# Patient Record
Sex: MALE | Race: WHITE | NOT HISPANIC OR LATINO | Employment: FULL TIME | URBAN - METROPOLITAN AREA
[De-identification: names, ages, dates, MRNs, and addresses within clinical notes are randomized per-mention and may not be internally consistent; named-entity substitution may affect disease eponyms.]

---

## 2018-11-12 ENCOUNTER — OFFICE VISIT (OUTPATIENT)
Dept: SPORTS MEDICINE | Facility: CLINIC | Age: 33
End: 2018-11-12

## 2018-11-12 VITALS
DIASTOLIC BLOOD PRESSURE: 88 MMHG | BODY MASS INDEX: 44.1 KG/M2 | WEIGHT: 315 LBS | HEIGHT: 71 IN | SYSTOLIC BLOOD PRESSURE: 116 MMHG

## 2018-11-12 DIAGNOSIS — M25.571 ACUTE RIGHT ANKLE PAIN: Primary | ICD-10-CM

## 2018-11-12 DIAGNOSIS — S92.134A CLOSED NONDISPLACED FRACTURE OF POSTERIOR PROCESS OF RIGHT TALUS, INITIAL ENCOUNTER: ICD-10-CM

## 2018-11-12 PROCEDURE — 99204 OFFICE O/P NEW MOD 45 MIN: CPT | Performed by: FAMILY MEDICINE

## 2018-11-12 PROCEDURE — 73610 X-RAY EXAM OF ANKLE: CPT | Performed by: FAMILY MEDICINE

## 2018-11-12 NOTE — PROGRESS NOTES
"Torey is a 33 y.o. year old male    Chief Complaint   Patient presents with   • Right Ankle - Edema, Numbness       History of Present Illness  HPI     Right ankle pain for a few days.  He was helping a friend move when he landed awkwardly twisted his ankle.  [place] a pop.  Had immediate swelling and also has associated bruising.  Difficulty bearing weight with first few steps.    I have reviewed the patient's medical, family, and social history in detail and updated the computerized patient record.    Review of Systems   Constitutional: Negative for fever.   Musculoskeletal:        Per HPI   Skin: Negative for rash.   Neurological: Negative for weakness and numbness.   Psychiatric/Behavioral: Negative for sleep disturbance.   All other systems reviewed and are negative.      /88   Ht 180.3 cm (71\")   Wt (!) 147 kg (324 lb)   BMI 45.19 kg/m²      Physical Exam    Vital signs reviewed.   General: No acute distress.  Eyes: conjunctiva clear; pupils equally round and reactive  ENT: external ears and nose atraumatic; oropharynx clear  CV: no peripheral edema, 2+ distal pulses  Resp: normal respiratory effort, no use of accessory muscles  Skin: no rashes or wounds; normal turgor  Psych: mood and affect appropriate; recent and remote memory intact  Neuro: sensation to light touch intact    MSK Exam:  Right Ankle Exam     Tenderness   Right ankle tenderness location: posterior talus laterally.  Swelling: moderate    Range of Motion   The patient has normal right ankle ROM.    Muscle Strength   The patient has normal right ankle strength.    Tests   Anterior drawer: negative    Comments:  Ecchymoses along lateral ankle      Left Ankle Exam   Left ankle exam is normal.          Right Ankle X-Ray  Indication: Pain  Views: AP, Lateral, Mortise    Findings:  Posterior talus fracture  No bony lesion  Soft tissues normal  Normal joint spaces    No prior studies available for comparison.    Diagnoses and all orders " for this visit:    Acute right ankle pain  -     XR Ankle 3+ View Right    Closed nondisplaced fracture of posterior process of right talus, initial encounter      Partial WB with walking boot. F/up 3 wks.    EMR Dragon/Transcription disclaimer:    Much of this encounter note is an electronic transcription/translation of spoken language to printed text.  The electronic translation of spoken language may permit erroneous, or at times, nonsensical words or phrases to be inadvertently transcribed.  Although I have reviewed the note for such errors some may still exist.

## 2018-12-03 ENCOUNTER — OFFICE VISIT (OUTPATIENT)
Dept: SPORTS MEDICINE | Facility: CLINIC | Age: 33
End: 2018-12-03

## 2018-12-03 VITALS
SYSTOLIC BLOOD PRESSURE: 122 MMHG | DIASTOLIC BLOOD PRESSURE: 88 MMHG | BODY MASS INDEX: 44.1 KG/M2 | WEIGHT: 315 LBS | HEIGHT: 71 IN

## 2018-12-03 DIAGNOSIS — M25.571 ACUTE RIGHT ANKLE PAIN: Primary | ICD-10-CM

## 2018-12-03 DIAGNOSIS — M77.31 CALCANEAL SPUR OF RIGHT FOOT: ICD-10-CM

## 2018-12-03 PROCEDURE — 99214 OFFICE O/P EST MOD 30 MIN: CPT | Performed by: FAMILY MEDICINE

## 2018-12-03 PROCEDURE — 73610 X-RAY EXAM OF ANKLE: CPT | Performed by: FAMILY MEDICINE

## 2018-12-03 NOTE — PROGRESS NOTES
"Torey is a 33 y.o. year old male    Chief Complaint   Patient presents with   • Right Ankle - Follow-up       History of Present Illness  Here for follow-up on right ankle pain.  He was partial weightbearing with walking boot up until yesterday when he took it off.  Has had some stiffness and pain though not to the level of was previously.  Swelling and bruising has resolved.    I have reviewed the patient's medical, family, and social history in detail and updated the computerized patient record.    Review of Systems  Constitutional: Negative for fever.   Musculoskeletal:        Per HPI   Skin: Negative for rash.   Neurological: Negative for weakness and numbness.   Psychiatric/Behavioral: Negative for sleep disturbance.   All other systems reviewed and are negative.    /88   Ht 180.3 cm (71\")   Wt (!) 147 kg (324 lb)   BMI 45.19 kg/m²      Physical Exam    Vital signs reviewed.   General: No acute distress.  Eyes: conjunctiva clear; pupils equally round and reactive  ENT: external ears and nose atraumatic; oropharynx clear  CV: no peripheral edema, 2+ distal pulses  Resp: normal respiratory effort, no use of accessory muscles  Skin: no rashes or wounds; normal turgor  Psych: mood and affect appropriate; recent and remote memory intact  Neuro: sensation to light touch intact    MSK Exam:    Right Ankle X-Ray  Indication: pain  Views: AP, Lateral, Mortise    Findings:  No fracture  Calcaneal heel spur that is unchanged when compared to her prior exam.  This was different interpretation when compared to radiologist over read and with today's x-ray.  Soft tissues normal  Normal joint spaces    Prior studies available for comparison.    Diagnoses and all orders for this visit:    Acute right ankle pain  -     XR Ankle 3+ View Right    Calcaneal spur of right foot  -     XR Ankle 3+ View Right      He could have irritated area where spur is located or even partial tear to one of the tendons in the ankle.  " Clinically is improving and we will continue to monitor out of boot.  Follow-up with me in 4 weeks.    EMR Dragon/Transcription disclaimer:    Much of this encounter note is an electronic transcription/translation of spoken language to printed text.  The electronic translation of spoken language may permit erroneous, or at times, nonsensical words or phrases to be inadvertently transcribed.  Although I have reviewed the note for such errors some may still exist.

## 2018-12-26 ENCOUNTER — OFFICE VISIT (OUTPATIENT)
Dept: SPORTS MEDICINE | Facility: CLINIC | Age: 33
End: 2018-12-26

## 2018-12-26 VITALS
HEIGHT: 71 IN | DIASTOLIC BLOOD PRESSURE: 94 MMHG | WEIGHT: 315 LBS | BODY MASS INDEX: 44.1 KG/M2 | SYSTOLIC BLOOD PRESSURE: 134 MMHG

## 2018-12-26 DIAGNOSIS — M67.88 RIGHT PERONEAL TENDINOSIS: ICD-10-CM

## 2018-12-26 DIAGNOSIS — M25.571 ACUTE RIGHT ANKLE PAIN: Primary | ICD-10-CM

## 2018-12-26 DIAGNOSIS — M77.31 CALCANEAL SPUR OF RIGHT FOOT: ICD-10-CM

## 2018-12-26 PROCEDURE — 99214 OFFICE O/P EST MOD 30 MIN: CPT | Performed by: FAMILY MEDICINE

## 2018-12-26 NOTE — PROGRESS NOTES
"Torey is a 33 y.o. year old male    Chief Complaint   Patient presents with   • Right Ankle - Follow-up       History of Present Illness  R ankle pain resolving though still has some intermittent pain along outside shooting down from calf in to foot. One day of swelling in ankle but after walking for long time that day. No pain at rest.    I have reviewed the patient's medical, family, and social history in detail and updated the computerized patient record.    Review of Systems  Constitutional: Negative for fever.   Musculoskeletal:        Per HPI   Skin: Negative for rash.   Neurological: Negative for weakness and numbness.   Psychiatric/Behavioral: Negative for sleep disturbance.   All other systems reviewed and are negative.    /94   Ht 180.3 cm (71\")   Wt (!) 147 kg (324 lb)   BMI 45.19 kg/m²      Physical Exam    Vital signs reviewed.   General: No acute distress.  Eyes: conjunctiva clear; pupils equally round and reactive  ENT: external ears and nose atraumatic; oropharynx clear  CV: no peripheral edema, 2+ distal pulses  Resp: normal respiratory effort, no use of accessory muscles  Skin: no rashes or wounds; normal turgor  Psych: mood and affect appropriate; recent and remote memory intact  Neuro: sensation to light touch intact    MSK Exam:    R ankle demonstrates full ROM. No bony tenderness. Neg ant drawer. 5/5 strength. No pain with single, double leg hop    Diagnoses and all orders for this visit:    Acute right ankle pain    Calcaneal spur of right foot    Right peroneal tendinosis      Likely soft tissue injury that is slowly resolving. Cont to monitor. F/up 4 wks if persists.    EMR Dragon/Transcription disclaimer:    Much of this encounter note is an electronic transcription/translation of spoken language to printed text.  The electronic translation of spoken language may permit erroneous, or at times, nonsensical words or phrases to be inadvertently transcribed.  Although I have " reviewed the note for such errors some may still exist.

## 2019-11-21 ENCOUNTER — APPOINTMENT (OUTPATIENT)
Dept: CT IMAGING | Facility: HOSPITAL | Age: 34
End: 2019-11-21

## 2019-11-21 ENCOUNTER — HOSPITAL ENCOUNTER (EMERGENCY)
Facility: HOSPITAL | Age: 34
Discharge: HOME | End: 2019-11-21
Attending: EMERGENCY MEDICINE | Admitting: EMERGENCY MEDICINE

## 2019-11-21 VITALS
BODY MASS INDEX: 33.6 KG/M2 | DIASTOLIC BLOOD PRESSURE: 98 MMHG | WEIGHT: 240 LBS | SYSTOLIC BLOOD PRESSURE: 146 MMHG | OXYGEN SATURATION: 99 % | HEART RATE: 66 BPM | TEMPERATURE: 97.6 F | HEIGHT: 71 IN | RESPIRATION RATE: 18 BRPM

## 2019-11-21 DIAGNOSIS — N20.0 KIDNEY STONE ON LEFT SIDE: Primary | ICD-10-CM

## 2019-11-21 LAB
ALBUMIN SERPL-MCNC: 4.7 G/DL (ref 3.5–5.2)
ALBUMIN/GLOB SERPL: 1.6 G/DL
ALP SERPL-CCNC: 59 U/L (ref 39–117)
ALT SERPL W P-5'-P-CCNC: 29 U/L (ref 1–41)
ANION GAP SERPL CALCULATED.3IONS-SCNC: 12.7 MMOL/L (ref 5–15)
AST SERPL-CCNC: 17 U/L (ref 1–40)
BACTERIA UR QL AUTO: ABNORMAL /HPF
BASOPHILS # BLD AUTO: 0.03 10*3/MM3 (ref 0–0.2)
BASOPHILS NFR BLD AUTO: 0.3 % (ref 0–1.5)
BILIRUB SERPL-MCNC: 1.8 MG/DL (ref 0.2–1.2)
BILIRUB UR QL STRIP: NEGATIVE
BUN BLD-MCNC: 23 MG/DL (ref 6–20)
BUN/CREAT SERPL: 19.2 (ref 7–25)
CALCIUM SPEC-SCNC: 9.7 MG/DL (ref 8.6–10.5)
CHLORIDE SERPL-SCNC: 108 MMOL/L (ref 98–107)
CLARITY UR: CLEAR
CO2 SERPL-SCNC: 25.3 MMOL/L (ref 22–29)
COLOR UR: YELLOW
CREAT BLD-MCNC: 1.2 MG/DL (ref 0.76–1.27)
DEPRECATED RDW RBC AUTO: 45.7 FL (ref 37–54)
EOSINOPHIL # BLD AUTO: 0.04 10*3/MM3 (ref 0–0.4)
EOSINOPHIL NFR BLD AUTO: 0.5 % (ref 0.3–6.2)
ERYTHROCYTE [DISTWIDTH] IN BLOOD BY AUTOMATED COUNT: 13.3 % (ref 12.3–15.4)
GFR SERPL CREATININE-BSD FRML MDRD: 69 ML/MIN/1.73
GLOBULIN UR ELPH-MCNC: 2.9 GM/DL
GLUCOSE BLD-MCNC: 91 MG/DL (ref 65–99)
GLUCOSE UR STRIP-MCNC: NEGATIVE MG/DL
HCT VFR BLD AUTO: 45.9 % (ref 37.5–51)
HGB BLD-MCNC: 15.2 G/DL (ref 13–17.7)
HGB UR QL STRIP.AUTO: ABNORMAL
HOLD SPECIMEN: NORMAL
HOLD SPECIMEN: NORMAL
HYALINE CASTS UR QL AUTO: ABNORMAL /LPF
IMM GRANULOCYTES # BLD AUTO: 0.04 10*3/MM3 (ref 0–0.05)
IMM GRANULOCYTES NFR BLD AUTO: 0.5 % (ref 0–0.5)
KETONES UR QL STRIP: NEGATIVE
LEUKOCYTE ESTERASE UR QL STRIP.AUTO: ABNORMAL
LIPASE SERPL-CCNC: 22 U/L (ref 13–60)
LYMPHOCYTES # BLD AUTO: 1.23 10*3/MM3 (ref 0.7–3.1)
LYMPHOCYTES NFR BLD AUTO: 14.3 % (ref 19.6–45.3)
MCH RBC QN AUTO: 30.4 PG (ref 26.6–33)
MCHC RBC AUTO-ENTMCNC: 33.1 G/DL (ref 31.5–35.7)
MCV RBC AUTO: 91.8 FL (ref 79–97)
MONOCYTES # BLD AUTO: 0.86 10*3/MM3 (ref 0.1–0.9)
MONOCYTES NFR BLD AUTO: 10 % (ref 5–12)
NEUTROPHILS # BLD AUTO: 6.42 10*3/MM3 (ref 1.7–7)
NEUTROPHILS NFR BLD AUTO: 74.4 % (ref 42.7–76)
NITRITE UR QL STRIP: NEGATIVE
NRBC BLD AUTO-RTO: 0 /100 WBC (ref 0–0.2)
PH UR STRIP.AUTO: 5.5 [PH] (ref 5–8)
PLATELET # BLD AUTO: 168 10*3/MM3 (ref 140–450)
PMV BLD AUTO: 11.6 FL (ref 6–12)
POTASSIUM BLD-SCNC: 4.4 MMOL/L (ref 3.5–5.2)
PROT SERPL-MCNC: 7.6 G/DL (ref 6–8.5)
PROT UR QL STRIP: NEGATIVE
RBC # BLD AUTO: 5 10*6/MM3 (ref 4.14–5.8)
RBC # UR: ABNORMAL /HPF
REF LAB TEST METHOD: ABNORMAL
SODIUM BLD-SCNC: 146 MMOL/L (ref 136–145)
SP GR UR STRIP: 1.02 (ref 1–1.03)
SQUAMOUS #/AREA URNS HPF: ABNORMAL /HPF
UROBILINOGEN UR QL STRIP: ABNORMAL
WBC NRBC COR # BLD: 8.62 10*3/MM3 (ref 3.4–10.8)
WBC UR QL AUTO: ABNORMAL /HPF
WHOLE BLOOD HOLD SPECIMEN: NORMAL
WHOLE BLOOD HOLD SPECIMEN: NORMAL

## 2019-11-21 PROCEDURE — 81001 URINALYSIS AUTO W/SCOPE: CPT | Performed by: EMERGENCY MEDICINE

## 2019-11-21 PROCEDURE — 83690 ASSAY OF LIPASE: CPT | Performed by: EMERGENCY MEDICINE

## 2019-11-21 PROCEDURE — 74176 CT ABD & PELVIS W/O CONTRAST: CPT

## 2019-11-21 PROCEDURE — 36415 COLL VENOUS BLD VENIPUNCTURE: CPT

## 2019-11-21 PROCEDURE — 85025 COMPLETE CBC W/AUTO DIFF WBC: CPT | Performed by: EMERGENCY MEDICINE

## 2019-11-21 PROCEDURE — 80053 COMPREHEN METABOLIC PANEL: CPT | Performed by: EMERGENCY MEDICINE

## 2019-11-21 PROCEDURE — 99284 EMERGENCY DEPT VISIT MOD MDM: CPT

## 2019-11-21 RX ORDER — SODIUM CHLORIDE 0.9 % (FLUSH) 0.9 %
10 SYRINGE (ML) INJECTION AS NEEDED
Status: DISCONTINUED | OUTPATIENT
Start: 2019-11-21 | End: 2019-11-21 | Stop reason: HOSPADM

## 2019-11-21 RX ORDER — ONDANSETRON 4 MG/1
4 TABLET, FILM COATED ORAL 4 TIMES DAILY PRN
Qty: 10 TABLET | Refills: 0 | Status: SHIPPED | OUTPATIENT
Start: 2019-11-21 | End: 2020-07-09

## 2019-11-21 RX ORDER — OXYCODONE HYDROCHLORIDE AND ACETAMINOPHEN 5; 325 MG/1; MG/1
1 TABLET ORAL EVERY 6 HOURS PRN
Qty: 20 TABLET | Refills: 0 | Status: SHIPPED | OUTPATIENT
Start: 2019-11-21 | End: 2020-07-09

## 2019-11-21 NOTE — ED TRIAGE NOTES
Patient presents to er via private vehicle from home.  Patient is reporting left flank pain that radiates to right groin since last night

## 2019-11-21 NOTE — PROGRESS NOTES
Per ER list, patient without current PCP.  Entered room, identified self, explained role, and verified facesheet information.  Patient confirmed that he did not currently have a PCP; [facility] provider list provided to patient with encouragement to contact as able to ensure continuity of care post-d/c.  Patient verbalized understanding and denied further questions or concerns.  Nelson Rivera RN

## 2019-11-21 NOTE — ED PROVIDER NOTES
EMERGENCY DEPARTMENT ENCOUNTER    Room Number:  08/08  PCP: Provider, No Known  Historian: Pt  History Limited By: None      HPI  Chief Complaint: Left flank pain  Context: Torey Coy is a 34 y.o. male who presents to the ED c/o constant left flank pain that radiates to his groin which has been worsening since 2:00AM. Pt denies fever, CP, SOA, abd pain, N/V/D, urinary sx, and testicular swelling and pain. Pt took Advil for the pain with temporary relief. Pt has a h/o kidney stones.       Location: L flank   Radiation: radiates to groin   Character: sharp  Duration: several hours   Severity: moderate   Progression: worsening   Aggravating Factors: none stated   Alleviating Factors: none stated        PAST MEDICAL HISTORY  Active Ambulatory Problems     Diagnosis Date Noted   • No Active Ambulatory Problems     Resolved Ambulatory Problems     Diagnosis Date Noted   • No Resolved Ambulatory Problems     No Additional Past Medical History         PAST SURGICAL HISTORY  Past Surgical History:   Procedure Laterality Date   • EYE SURGERY     • TUMOR REMOVAL      LT ear         FAMILY HISTORY  Family History   Problem Relation Age of Onset   • No Known Problems Mother    • No Known Problems Father          SOCIAL HISTORY  Social History     Socioeconomic History   • Marital status: Single     Spouse name: Not on file   • Number of children: Not on file   • Years of education: Not on file   • Highest education level: Not on file   Tobacco Use   • Smoking status: Never Smoker   Substance and Sexual Activity   • Alcohol use: Yes     Comment: occasional   • Sexual activity: Defer         ALLERGIES  Patient has no known allergies.        REVIEW OF SYSTEMS  Review of Systems   Constitutional: Negative for activity change, appetite change and fever.   HENT: Negative for congestion and sore throat.    Eyes: Negative.    Respiratory: Negative for cough and shortness of breath.    Cardiovascular: Negative for chest pain  and leg swelling.   Gastrointestinal: Negative for abdominal pain, diarrhea, nausea and vomiting.   Endocrine: Negative.    Genitourinary: Positive for flank pain (left flank). Negative for decreased urine volume, dysuria, hematuria, scrotal swelling and testicular pain.   Musculoskeletal: Negative for neck pain.   Skin: Negative for rash and wound.   Allergic/Immunologic: Negative.    Neurological: Negative for weakness, numbness and headaches.   Hematological: Negative.    Psychiatric/Behavioral: Negative.    All other systems reviewed and are negative.           PHYSICAL EXAM  ED Triage Vitals [11/21/19 1243]   Temp Heart Rate Resp BP SpO2   96.2 °F (35.7 °C) 74 15 -- 97 %      Temp src Heart Rate Source Patient Position BP Location FiO2 (%)   Tympanic Monitor -- -- --       Physical Exam   Constitutional: He is oriented to person, place, and time. No distress.   HENT:   Head: Normocephalic and atraumatic.   Eyes: EOM are normal. Pupils are equal, round, and reactive to light.   Neck: Normal range of motion. Neck supple.   Cardiovascular: Normal rate, regular rhythm and normal heart sounds.   Pulmonary/Chest: Effort normal and breath sounds normal. No respiratory distress.   Abdominal: Soft. There is no tenderness. There is CVA tenderness (mild left). There is no rebound and no guarding.   Musculoskeletal: Normal range of motion. He exhibits no edema.   Neurological: He is alert and oriented to person, place, and time. He has normal sensation and normal strength.   Skin: Skin is warm and dry.   Psychiatric: Mood and affect normal.   Nursing note and vitals reviewed.          LAB RESULTS  Recent Results (from the past 24 hour(s))   Comprehensive Metabolic Panel    Collection Time: 11/21/19 12:56 PM   Result Value Ref Range    Glucose 91 65 - 99 mg/dL    BUN 23 (H) 6 - 20 mg/dL    Creatinine 1.20 0.76 - 1.27 mg/dL    Sodium 146 (H) 136 - 145 mmol/L    Potassium 4.4 3.5 - 5.2 mmol/L    Chloride 108 (H) 98 - 107  mmol/L    CO2 25.3 22.0 - 29.0 mmol/L    Calcium 9.7 8.6 - 10.5 mg/dL    Total Protein 7.6 6.0 - 8.5 g/dL    Albumin 4.70 3.50 - 5.20 g/dL    ALT (SGPT) 29 1 - 41 U/L    AST (SGOT) 17 1 - 40 U/L    Alkaline Phosphatase 59 39 - 117 U/L    Total Bilirubin 1.8 (H) 0.2 - 1.2 mg/dL    eGFR Non African Amer 69 >60 mL/min/1.73    Globulin 2.9 gm/dL    A/G Ratio 1.6 g/dL    BUN/Creatinine Ratio 19.2 7.0 - 25.0    Anion Gap 12.7 5.0 - 15.0 mmol/L   Lipase    Collection Time: 11/21/19 12:56 PM   Result Value Ref Range    Lipase 22 13 - 60 U/L   Light Blue Top    Collection Time: 11/21/19 12:56 PM   Result Value Ref Range    Extra Tube hold for add-on    Green Top (Gel)    Collection Time: 11/21/19 12:56 PM   Result Value Ref Range    Extra Tube Hold for add-ons.    Lavender Top    Collection Time: 11/21/19 12:56 PM   Result Value Ref Range    Extra Tube hold for add-on    Gold Top - SST    Collection Time: 11/21/19 12:56 PM   Result Value Ref Range    Extra Tube Hold for add-ons.    CBC Auto Differential    Collection Time: 11/21/19 12:56 PM   Result Value Ref Range    WBC 8.62 3.40 - 10.80 10*3/mm3    RBC 5.00 4.14 - 5.80 10*6/mm3    Hemoglobin 15.2 13.0 - 17.7 g/dL    Hematocrit 45.9 37.5 - 51.0 %    MCV 91.8 79.0 - 97.0 fL    MCH 30.4 26.6 - 33.0 pg    MCHC 33.1 31.5 - 35.7 g/dL    RDW 13.3 12.3 - 15.4 %    RDW-SD 45.7 37.0 - 54.0 fl    MPV 11.6 6.0 - 12.0 fL    Platelets 168 140 - 450 10*3/mm3    Neutrophil % 74.4 42.7 - 76.0 %    Lymphocyte % 14.3 (L) 19.6 - 45.3 %    Monocyte % 10.0 5.0 - 12.0 %    Eosinophil % 0.5 0.3 - 6.2 %    Basophil % 0.3 0.0 - 1.5 %    Immature Grans % 0.5 0.0 - 0.5 %    Neutrophils, Absolute 6.42 1.70 - 7.00 10*3/mm3    Lymphocytes, Absolute 1.23 0.70 - 3.10 10*3/mm3    Monocytes, Absolute 0.86 0.10 - 0.90 10*3/mm3    Eosinophils, Absolute 0.04 0.00 - 0.40 10*3/mm3    Basophils, Absolute 0.03 0.00 - 0.20 10*3/mm3    Immature Grans, Absolute 0.04 0.00 - 0.05 10*3/mm3    nRBC 0.0 0.0 - 0.2 /100  WBC   Urinalysis With Microscopic If Indicated (No Culture) - Urine, Clean Catch    Collection Time: 11/21/19  1:13 PM   Result Value Ref Range    Color, UA Yellow Yellow, Straw    Appearance, UA Clear Clear    pH, UA 5.5 5.0 - 8.0    Specific Gravity, UA 1.019 1.005 - 1.030    Glucose, UA Negative Negative    Ketones, UA Negative Negative    Bilirubin, UA Negative Negative    Blood, UA Trace (A) Negative    Protein, UA Negative Negative    Leuk Esterase, UA Trace (A) Negative    Nitrite, UA Negative Negative    Urobilinogen, UA 0.2 E.U./dL 0.2 - 1.0 E.U./dL   Urinalysis, Microscopic Only - Urine, Clean Catch    Collection Time: 11/21/19  1:13 PM   Result Value Ref Range    RBC, UA 13-20 (A) None Seen, 0-2 /HPF    WBC, UA 6-12 (A) None Seen, 0-2 /HPF    Bacteria, UA None Seen None Seen /HPF    Squamous Epithelial Cells, UA 0-2 None Seen, 0-2 /HPF    Hyaline Casts, UA None Seen None Seen /LPF    Methodology Automated Microscopy        Ordered the above labs and reviewed the results.        RADIOLOGY  CT Abdomen Pelvis Without Contrast   Preliminary Result   There is a 9 mm stone within the proximal left ureter just   distal to the UPJ with moderate hydroureteronephrosis.       Discussed with Dr. Oseguera.               Ordered the above noted radiological studies. Reviewed by me in PACS.         PROCEDURES  Procedures          MEDICATIONS GIVEN IN ER  Medications   sodium chloride 0.9 % flush 10 mL (not administered)             PROGRESS AND CONSULTS  ED Course as of Nov 21 1613   Thu Nov 21, 2019   1544 3:44 PM  Patient here for flank pain and appears to have a kidney stone.  Large stone.  His pain has not been an issue.  No signs of infection.  Discussed with Dr. Santamaria who will see him in the office tomorrow.  NPO after midnight.  Be in the office at 8 am.  [SL]      ED Course User Index  [SL] Sanket Oseguera MD     12:47 PM  Labs ordered.     1:09 PM  CT abd pelvis ordered. Pt declined pain medicine at this  time.     2:59 PM  Dr. Spann, radiology, reports CT abd pelvis shows a 9mm left ureteral stone.   Rechecked pt who is resting in NAD. Informed him of the stone seen on CT. Consult placed to urology.     4161 Discussed with Dr. Santamaria who will see patient in the office tomorrow at 8am.  NPO.      MEDICAL DECISION MAKING      MDM  Number of Diagnoses or Management Options     Amount and/or Complexity of Data Reviewed  Clinical lab tests: ordered and reviewed (Urinalysis- RBC 13-20, WBC 6-12, Bacteria none)  Tests in the radiology section of CPT®: ordered and reviewed ( CT abd pelvis shows a 9mm left ureteral stone)  Discussion of test results with the performing providers: yes (Dr. Spann)  Decide to obtain previous medical records or to obtain history from someone other than the patient: yes  Review and summarize past medical records: yes               DIAGNOSIS  Final diagnoses:   Kidney stone on left side           DISPOSITION  DISCHARGE    Patient discharged in stable condition.    Reviewed implications of results, diagnosis, meds, responsibility to follow up, warning signs and symptoms of possible worsening, potential complications and reasons to return to ER.    Patient/Family voiced understanding of above instructions.    Discussed plan for discharge, as there is no emergent indication for admission. Patient referred to primary care provider for BP management due to today's BP. Pt/family is agreeable and understands need for follow up and repeat testing.  Pt is aware that discharge does not mean that nothing is wrong but it indicates no emergency is present that requires admission and they must continue care with follow-up as given below or physician of their choice.     FOLLOW-UP  Torey Santamaria MD  [address] [id number] [phone]    Go to   tomorrow at 8am         Medication List      New Prescriptions    ondansetron 4 MG tablet  Commonly known as:  ZOFRAN  Take 1 tablet by mouth  4 (Four) Times a Day As Needed for Nausea or   Vomiting.     oxyCODONE-acetaminophen 5-325 MG per tablet  Commonly known as:  PERCOCET  Take 1 tablet by mouth Every 6 (Six) Hours As Needed for Severe Pain .              Latest Documented Vital Signs:  As of 4:13 PM  BP- 146/98 HR- 66 Temp- 97.6 °F (36.4 °C) (Oral) O2 sat- 99%        --  Documentation assistance provided by paola Starr for Dr. Oumar MD.  Information recorded by the scribe was done at my direction and has been verified and validated by me.           Rosie Starr  11/21/19 1514       Sanket Oseguera MD  11/21/19 1617

## 2019-11-22 ENCOUNTER — HOSPITAL ENCOUNTER (OUTPATIENT)
Facility: HOSPITAL | Age: 34
Setting detail: HOSPITAL OUTPATIENT SURGERY
Discharge: HOME | End: 2019-11-22
Attending: UROLOGY | Admitting: UROLOGY

## 2019-11-22 ENCOUNTER — ANESTHESIA EVENT (OUTPATIENT)
Dept: PERIOP | Facility: HOSPITAL | Age: 34
End: 2019-11-22

## 2019-11-22 ENCOUNTER — ANESTHESIA (OUTPATIENT)
Dept: PERIOP | Facility: HOSPITAL | Age: 34
End: 2019-11-22

## 2019-11-22 VITALS
TEMPERATURE: 98.5 F | RESPIRATION RATE: 16 BRPM | OXYGEN SATURATION: 97 % | SYSTOLIC BLOOD PRESSURE: 122 MMHG | WEIGHT: 245.81 LBS | BODY MASS INDEX: 34.28 KG/M2 | HEART RATE: 61 BPM | DIASTOLIC BLOOD PRESSURE: 81 MMHG

## 2019-11-22 DIAGNOSIS — N20.1 URETERAL STONE: Primary | ICD-10-CM

## 2019-11-22 PROCEDURE — C2617 STENT, NON-COR, TEM W/O DEL: HCPCS | Performed by: UROLOGY

## 2019-11-22 PROCEDURE — 25010000002 ONDANSETRON PER 1 MG: Performed by: NURSE ANESTHETIST, CERTIFIED REGISTERED

## 2019-11-22 PROCEDURE — C1758 CATHETER, URETERAL: HCPCS | Performed by: UROLOGY

## 2019-11-22 PROCEDURE — 25010000002 CEFAZOLIN PER 500 MG: Performed by: NURSE ANESTHETIST, CERTIFIED REGISTERED

## 2019-11-22 PROCEDURE — C1769 GUIDE WIRE: HCPCS | Performed by: UROLOGY

## 2019-11-22 PROCEDURE — 25010000002 FENTANYL CITRATE (PF) 100 MCG/2ML SOLUTION: Performed by: ANESTHESIOLOGY

## 2019-11-22 PROCEDURE — 25010000002 PROPOFOL 10 MG/ML EMULSION: Performed by: NURSE ANESTHETIST, CERTIFIED REGISTERED

## 2019-11-22 PROCEDURE — 25010000002 DEXAMETHASONE PER 1 MG: Performed by: NURSE ANESTHETIST, CERTIFIED REGISTERED

## 2019-11-22 PROCEDURE — 25010000002 FENTANYL CITRATE (PF) 100 MCG/2ML SOLUTION: Performed by: NURSE ANESTHETIST, CERTIFIED REGISTERED

## 2019-11-22 DEVICE — URETERAL STENT
Type: IMPLANTABLE DEVICE | Site: URETER | Status: FUNCTIONAL
Brand: CONTOUR™

## 2019-11-22 RX ORDER — PROPOFOL 10 MG/ML
VIAL (ML) INTRAVENOUS AS NEEDED
Status: DISCONTINUED | OUTPATIENT
Start: 2019-11-22 | End: 2019-11-22 | Stop reason: SURG

## 2019-11-22 RX ORDER — LIDOCAINE HYDROCHLORIDE 20 MG/ML
INJECTION, SOLUTION INFILTRATION; PERINEURAL AS NEEDED
Status: DISCONTINUED | OUTPATIENT
Start: 2019-11-22 | End: 2019-11-22 | Stop reason: SURG

## 2019-11-22 RX ORDER — PROMETHAZINE HYDROCHLORIDE 25 MG/ML
6.25 INJECTION, SOLUTION INTRAMUSCULAR; INTRAVENOUS ONCE AS NEEDED
Status: DISCONTINUED | OUTPATIENT
Start: 2019-11-22 | End: 2019-11-22 | Stop reason: HOSPADM

## 2019-11-22 RX ORDER — HYDRALAZINE HYDROCHLORIDE 20 MG/ML
5 INJECTION INTRAMUSCULAR; INTRAVENOUS
Status: DISCONTINUED | OUTPATIENT
Start: 2019-11-22 | End: 2019-11-22 | Stop reason: HOSPADM

## 2019-11-22 RX ORDER — GLYCOPYRROLATE 0.2 MG/ML
INJECTION INTRAMUSCULAR; INTRAVENOUS AS NEEDED
Status: DISCONTINUED | OUTPATIENT
Start: 2019-11-22 | End: 2019-11-22 | Stop reason: SURG

## 2019-11-22 RX ORDER — SODIUM CHLORIDE 0.9 % (FLUSH) 0.9 %
3-10 SYRINGE (ML) INJECTION AS NEEDED
Status: DISCONTINUED | OUTPATIENT
Start: 2019-11-22 | End: 2019-11-22 | Stop reason: HOSPADM

## 2019-11-22 RX ORDER — CEFAZOLIN SODIUM 500 MG/2.2ML
INJECTION, POWDER, FOR SOLUTION INTRAMUSCULAR; INTRAVENOUS AS NEEDED
Status: DISCONTINUED | OUTPATIENT
Start: 2019-11-22 | End: 2019-11-22 | Stop reason: SURG

## 2019-11-22 RX ORDER — PROMETHAZINE HYDROCHLORIDE 25 MG/1
25 SUPPOSITORY RECTAL ONCE AS NEEDED
Status: DISCONTINUED | OUTPATIENT
Start: 2019-11-22 | End: 2019-11-22 | Stop reason: HOSPADM

## 2019-11-22 RX ORDER — NALBUPHINE HCL 10 MG/ML
10 AMPUL (ML) INJECTION EVERY 4 HOURS PRN
Status: DISCONTINUED | OUTPATIENT
Start: 2019-11-22 | End: 2019-11-22 | Stop reason: HOSPADM

## 2019-11-22 RX ORDER — ACETAMINOPHEN 650 MG/1
650 SUPPOSITORY RECTAL ONCE AS NEEDED
Status: DISCONTINUED | OUTPATIENT
Start: 2019-11-22 | End: 2019-11-22 | Stop reason: HOSPADM

## 2019-11-22 RX ORDER — LIDOCAINE HYDROCHLORIDE 10 MG/ML
0.5 INJECTION, SOLUTION EPIDURAL; INFILTRATION; INTRACAUDAL; PERINEURAL ONCE AS NEEDED
Status: DISCONTINUED | OUTPATIENT
Start: 2019-11-22 | End: 2019-11-22 | Stop reason: HOSPADM

## 2019-11-22 RX ORDER — ACETAMINOPHEN 325 MG/1
650 TABLET ORAL ONCE AS NEEDED
Status: DISCONTINUED | OUTPATIENT
Start: 2019-11-22 | End: 2019-11-22 | Stop reason: HOSPADM

## 2019-11-22 RX ORDER — FENTANYL CITRATE 50 UG/ML
50 INJECTION, SOLUTION INTRAMUSCULAR; INTRAVENOUS
Status: DISCONTINUED | OUTPATIENT
Start: 2019-11-22 | End: 2019-11-22 | Stop reason: HOSPADM

## 2019-11-22 RX ORDER — MIDAZOLAM HYDROCHLORIDE 1 MG/ML
2 INJECTION INTRAMUSCULAR; INTRAVENOUS
Status: DISCONTINUED | OUTPATIENT
Start: 2019-11-22 | End: 2019-11-22 | Stop reason: HOSPADM

## 2019-11-22 RX ORDER — ONDANSETRON 2 MG/ML
INJECTION INTRAMUSCULAR; INTRAVENOUS AS NEEDED
Status: DISCONTINUED | OUTPATIENT
Start: 2019-11-22 | End: 2019-11-22 | Stop reason: SURG

## 2019-11-22 RX ORDER — SULFAMETHOXAZOLE AND TRIMETHOPRIM 800; 160 MG/1; MG/1
1 TABLET ORAL 2 TIMES DAILY
Qty: 10 TABLET | Refills: 0 | Status: SHIPPED | OUTPATIENT
Start: 2019-11-22 | End: 2020-07-09

## 2019-11-22 RX ORDER — SODIUM CHLORIDE, SODIUM LACTATE, POTASSIUM CHLORIDE, CALCIUM CHLORIDE 600; 310; 30; 20 MG/100ML; MG/100ML; MG/100ML; MG/100ML
9 INJECTION, SOLUTION INTRAVENOUS CONTINUOUS
Status: DISCONTINUED | OUTPATIENT
Start: 2019-11-22 | End: 2019-11-22 | Stop reason: HOSPADM

## 2019-11-22 RX ORDER — NALBUPHINE HCL 10 MG/ML
2 AMPUL (ML) INJECTION EVERY 4 HOURS PRN
Status: DISCONTINUED | OUTPATIENT
Start: 2019-11-22 | End: 2019-11-22 | Stop reason: HOSPADM

## 2019-11-22 RX ORDER — NALOXONE HCL 0.4 MG/ML
0.4 VIAL (ML) INJECTION AS NEEDED
Status: DISCONTINUED | OUTPATIENT
Start: 2019-11-22 | End: 2019-11-22 | Stop reason: HOSPADM

## 2019-11-22 RX ORDER — PROMETHAZINE HYDROCHLORIDE 25 MG/1
25 TABLET ORAL ONCE AS NEEDED
Status: DISCONTINUED | OUTPATIENT
Start: 2019-11-22 | End: 2019-11-22 | Stop reason: HOSPADM

## 2019-11-22 RX ORDER — DEXAMETHASONE SODIUM PHOSPHATE 10 MG/ML
INJECTION INTRAMUSCULAR; INTRAVENOUS AS NEEDED
Status: DISCONTINUED | OUTPATIENT
Start: 2019-11-22 | End: 2019-11-22 | Stop reason: SURG

## 2019-11-22 RX ORDER — SODIUM CHLORIDE 0.9 % (FLUSH) 0.9 %
3 SYRINGE (ML) INJECTION EVERY 12 HOURS SCHEDULED
Status: DISCONTINUED | OUTPATIENT
Start: 2019-11-22 | End: 2019-11-22 | Stop reason: HOSPADM

## 2019-11-22 RX ORDER — HYDROCODONE BITARTRATE AND ACETAMINOPHEN 5; 325 MG/1; MG/1
1 TABLET ORAL ONCE AS NEEDED
Status: COMPLETED | OUTPATIENT
Start: 2019-11-22 | End: 2019-11-22

## 2019-11-22 RX ORDER — MIDAZOLAM HYDROCHLORIDE 1 MG/ML
1 INJECTION INTRAMUSCULAR; INTRAVENOUS
Status: DISCONTINUED | OUTPATIENT
Start: 2019-11-22 | End: 2019-11-22 | Stop reason: HOSPADM

## 2019-11-22 RX ORDER — ACETAMINOPHEN 500 MG
500 TABLET ORAL ONCE
Status: COMPLETED | OUTPATIENT
Start: 2019-11-22 | End: 2019-11-22

## 2019-11-22 RX ORDER — FENTANYL CITRATE 50 UG/ML
INJECTION, SOLUTION INTRAMUSCULAR; INTRAVENOUS AS NEEDED
Status: DISCONTINUED | OUTPATIENT
Start: 2019-11-22 | End: 2019-11-22 | Stop reason: SURG

## 2019-11-22 RX ORDER — DIPHENHYDRAMINE HYDROCHLORIDE 50 MG/ML
12.5 INJECTION INTRAMUSCULAR; INTRAVENOUS
Status: DISCONTINUED | OUTPATIENT
Start: 2019-11-22 | End: 2019-11-22 | Stop reason: HOSPADM

## 2019-11-22 RX ADMIN — FENTANYL CITRATE 50 MCG: 50 INJECTION, SOLUTION INTRAMUSCULAR; INTRAVENOUS at 16:11

## 2019-11-22 RX ADMIN — CEFAZOLIN 2 G: 225 INJECTION, POWDER, FOR SOLUTION INTRAMUSCULAR; INTRAVENOUS at 15:11

## 2019-11-22 RX ADMIN — ONDANSETRON HYDROCHLORIDE 4 MG: 2 SOLUTION INTRAMUSCULAR; INTRAVENOUS at 15:25

## 2019-11-22 RX ADMIN — PROPOFOL 200 MG: 10 INJECTION, EMULSION INTRAVENOUS at 15:06

## 2019-11-22 RX ADMIN — HYDROCODONE BITARTRATE AND ACETAMINOPHEN 1 TABLET: 5; 325 TABLET ORAL at 16:22

## 2019-11-22 RX ADMIN — ACETAMINOPHEN 500 MG: 500 TABLET, FILM COATED ORAL at 14:18

## 2019-11-22 RX ADMIN — GLYCOPYRROLATE 0.2 MG: 0.2 INJECTION INTRAMUSCULAR; INTRAVENOUS at 15:14

## 2019-11-22 RX ADMIN — LIDOCAINE HYDROCHLORIDE 60 MG: 20 INJECTION, SOLUTION INFILTRATION; PERINEURAL at 15:06

## 2019-11-22 RX ADMIN — FENTANYL CITRATE 50 MCG: 50 INJECTION INTRAMUSCULAR; INTRAVENOUS at 15:06

## 2019-11-22 RX ADMIN — SODIUM CHLORIDE, POTASSIUM CHLORIDE, SODIUM LACTATE AND CALCIUM CHLORIDE: 600; 310; 30; 20 INJECTION, SOLUTION INTRAVENOUS at 15:55

## 2019-11-22 RX ADMIN — SODIUM CHLORIDE, POTASSIUM CHLORIDE, SODIUM LACTATE AND CALCIUM CHLORIDE 9 ML/HR: 600; 310; 30; 20 INJECTION, SOLUTION INTRAVENOUS at 14:18

## 2019-11-22 RX ADMIN — DEXAMETHASONE SODIUM PHOSPHATE 8 MG: 10 INJECTION INTRAMUSCULAR; INTRAVENOUS at 15:13

## 2019-11-22 NOTE — ANESTHESIA PROCEDURE NOTES
Airway  Urgency: elective    Date/Time: 11/22/2019 3:08 PM  Airway not difficult    General Information and Staff    Patient location during procedure: OR  Anesthesiologist: Render, Jeffrey Ray, MD  CRNA: Olivia Razo CRNA    Indications and Patient Condition  Indications for airway management: airway protection    Preoxygenated: yes  MILS maintained throughout  Mask difficulty assessment: 1 - vent by mask    Final Airway Details  Final airway type: supraglottic airway      Successful airway: classic  Size 5    Number of attempts at approach: 1  Assessment: lips, teeth, and gum same as pre-op    Additional Comments  Pt preoxygenated, sivi, LMA placed with adequate seal and TV

## 2019-11-22 NOTE — OP NOTE
PREOPERATIVE DIAGNOSIS: Left ureteral stone.     POSTOPERATIVE DIAGNOSIS: Same    PROCEDURE: Cystoscopy left ureteral stent placement left extracorporal shockwave lithotripsy.    SURGEON:  Joseph Ramos MD    ASSISTANT: None    ANESTHESIA: General    EBL: None    DRAINS: 6 Canadian by 28 cm double-J ureteral stent.    COMPLICATIONS: None    FINDINGS: Left UPJ stone.    INDICATIONS FOR PROCEDURE: Prevent 9 mm left UPJ stone.  Patient presents today for cystoscopy left stent placement left ESWL.  Risk and benefits were explained include but not limited to bleeding, infection, damage to kidney ureter.  Patient understands this is a staged procedure.  Patient consented to the procedure.    DESCRIPTION OF PROCEDURE: After receiving antibiotics was taken to the cystoscopy suite placed in supine position on the lithotripsy table.  He underwent a general anesthesia.  After adequate anesthesia was obtained his groins prepped and draped sterile fashion.  A flexible cystoscope was introduced into the urethra and the bladder the urethra and prostate within normal limits once into the bladder the ureteral orifice ease and noted bilaterally.  There were no masses or stones in the bladder.  I placed an 035 Bentson tip guidewire into the left orifice and passed a stone into the left renal pelvis which was confirmed by fluoroscopy.  I then placed a 6 Canadian by 28 cm double-J ureteral stent over the guidewire Seldinger technique.  There is good coil noted in the left renal pelvis which was confirmed by fluoroscopy.  There is good coil noted in the bladder confirmed by cystoscopy.  The cystoscope was removed the string was taped to the penis.    The stone in the proximal ureter was then placed between the crosshairs both AP and laterally.  The stone received a total of 3000 shocks at a max power 26 kV and appeared to fragment nicely.  He was extubated taken recovery in satisfactory condition.  He tolerated procedure well.

## 2019-11-22 NOTE — ANESTHESIA PREPROCEDURE EVALUATION
Anesthesia Evaluation     NPO Solid Status: > 8 hours             Airway   Mallampati: II  TM distance: >3 FB  Neck ROM: full  No difficulty expected  Dental - normal exam     Pulmonary - negative pulmonary ROS and normal exam   Cardiovascular - negative cardio ROS    Rhythm: regular        Neuro/Psych- negative ROS  GI/Hepatic/Renal/Endo    (+)   renal disease,     Musculoskeletal (-) negative ROS    Abdominal    Substance History - negative use     OB/GYN          Other                        Anesthesia Plan    ASA 2     general   (  D/W R&B of GA including but not limited to: heart, lung, liver, kidney, neurologic problems, positioning injuries, dental damage, corneal abrasion and TMJ.  .)  intravenous induction     Anesthetic plan, all risks, benefits, and alternatives have been provided, discussed and informed consent has been obtained with: patient.

## 2019-11-22 NOTE — ANESTHESIA POSTPROCEDURE EVALUATION
Patient: Torey Coy    Procedure Summary     Date:  11/22/19 Room / Location:   [facility]    Anesthesia Start:  1503 Anesthesia Stop:  1602    Procedure:  EXTRACORPOREAL SHOCKWAVE LITHOTRIPSY WITH CYSTOSCOPY, AND  LEFT STENT INSERTION (Left ) Diagnosis:      Surgeon:  Joseph Ramos MD Provider:  Jeffrey Carver MD    Anesthesia Type:  general ASA Status:  2          Anesthesia Type: general  Last vitals  BP   122/81 (11/22/19 1642)   Temp   36.9 °C (98.5 °F) (11/22/19 1638)   Pulse   61 (11/22/19 1642)   Resp   16 (11/22/19 1642)     SpO2   97 % (11/22/19 1642)     Post Anesthesia Care and Evaluation    Patient participation: complete - patient cannot participate  Anesthetic complications: No anesthetic complications    Cardiovascular status: acceptable  Respiratory status: acceptable  Hydration status: acceptable    Comments: Patient was discharged prior to being evaluated by Anesthesia...per chart review, no anesthetic complications were noted.  NOT MEDICALLY DIRECTED  /81   Pulse 61   Temp 36.9 °C (98.5 °F) (Temporal)   Resp 16   Wt 112 kg (245 lb 13 oz)   SpO2 97%   BMI 34.28 kg/m²

## 2019-11-22 NOTE — H&P
FIRST UROLOGY CONSULT      Patient Identification:  NAME:  Torey Coy  Age:  34 y.o.   Sex:  male   :  1985   MRN:  [id number]       Chief complaint: L ureteral stone.      History of present illness:  H/o L ureteral stone. For L ESWL.        Past medical history:  Past Medical History:   Diagnosis Date   • Kidney stone        Past surgical history:  Past Surgical History:   Procedure Laterality Date   • EYE SURGERY     • TUMOR REMOVAL      LT ear       Allergies:  Patient has no known allergies.    Home medications:  Medications Prior to Admission   Medication Sig Dispense Refill Last Dose   • ondansetron (ZOFRAN) 4 MG tablet Take 1 tablet by mouth 4 (Four) Times a Day As Needed for Nausea or Vomiting. 10 tablet 0 2019 at Unknown time   • oxyCODONE-acetaminophen (PERCOCET) 5-325 MG per tablet Take 1 tablet by mouth Every 6 (Six) Hours As Needed for Severe Pain . 20 tablet 0 2019 at Unknown time        Hospital medications:    sodium chloride 3 mL Intravenous Q12H       lactated ringers 9 mL/hr Last Rate: 9 mL/hr (19 1418)     lidocaine PF 1%  •  midazolam **OR** midazolam  •  sodium chloride    Family history:  Family History   Problem Relation Age of Onset   • No Known Problems Mother    • No Known Problems Father        Social history:  Social History     Tobacco Use   • Smoking status: Never Smoker   Substance Use Topics   • Alcohol use: Yes     Comment: occasional   • Drug use: Not on file       Review of systems:      Positive for:  Ureteral stone.    Negative for:  Fever.      Objective:  TMax 24 hours:   Temp (24hrs), Av.5 °F (36.4 °C), Min:97.5 °F (36.4 °C), Max:97.5 °F (36.4 °C)      Vitals Ranges:   Temp:  [97.5 °F (36.4 °C)] 97.5 °F (36.4 °C)  Heart Rate:  [66-79] 79  Resp:  [16-18] 16  BP: (117-146)/(65-98) 117/83    Intake/Output Last 3 shifts:  No intake/output data recorded.     Physical Exam:    General Appearance:    Alert, cooperative, NAD   HEENT:     No trauma, pupils reactive, hearing intact   Back:     No CVA tenderness   Lungs:     Respirations unlabored, no wheezing    Heart:    RRR.   Abdomen:     Soft, NDNT, no masses, no guarding   :       Extremities:   No edema, no deformity   Lymphatic:   No neck or groin LAD   Skin:   No bleeding, bruising or rashes   Neuro/Psych:   Orientation intact, mood/affect pleasant, no focal findings       Results review:   I reviewed the patient's new clinical results.    Data review:  Lab Results (last 24 hours)     ** No results found for the last 24 hours. **           Imaging:  Imaging Results (Last 24 Hours)     ** No results found for the last 24 hours. **             Assessment:       * No active hospital problems. *    L ureteral stone.        Plan:  L ESWL possible L stent placement.  R/B d/w pt.      Joseph Ramos MD  11/22/19  2:24 PM

## 2019-11-27 ENCOUNTER — HOSPITAL ENCOUNTER (OUTPATIENT)
Dept: GENERAL RADIOLOGY | Facility: HOSPITAL | Age: 34
Discharge: HOME | End: 2019-11-27
Admitting: UROLOGY

## 2019-11-27 DIAGNOSIS — N20.1 CALCULUS OF URETER: ICD-10-CM

## 2019-11-27 PROCEDURE — 74018 RADEX ABDOMEN 1 VIEW: CPT

## 2020-07-09 ENCOUNTER — OFFICE VISIT (OUTPATIENT)
Dept: FAMILY MEDICINE CLINIC | Facility: CLINIC | Age: 35
End: 2020-07-09

## 2020-07-09 VITALS
WEIGHT: 293 LBS | HEIGHT: 71 IN | HEART RATE: 70 BPM | DIASTOLIC BLOOD PRESSURE: 88 MMHG | SYSTOLIC BLOOD PRESSURE: 130 MMHG | OXYGEN SATURATION: 98 % | BODY MASS INDEX: 41.02 KG/M2 | TEMPERATURE: 97.7 F

## 2020-07-09 DIAGNOSIS — Z82.49 FAMILY HISTORY OF PREMATURE CORONARY HEART DISEASE: ICD-10-CM

## 2020-07-09 DIAGNOSIS — M79.672 LEFT FOOT PAIN: Primary | ICD-10-CM

## 2020-07-09 DIAGNOSIS — Z13.220 LIPID SCREENING: ICD-10-CM

## 2020-07-09 LAB
ALBUMIN SERPL-MCNC: 4.3 G/DL (ref 3.5–5.2)
ALBUMIN/GLOB SERPL: 1.5 G/DL
ALP SERPL-CCNC: 39 U/L (ref 39–117)
ALT SERPL W P-5'-P-CCNC: 20 U/L (ref 1–41)
ANION GAP SERPL CALCULATED.3IONS-SCNC: 8.7 MMOL/L (ref 5–15)
AST SERPL-CCNC: 15 U/L (ref 1–40)
BILIRUB SERPL-MCNC: 0.7 MG/DL (ref 0–1.2)
BUN SERPL-MCNC: 15 MG/DL (ref 6–20)
BUN/CREAT SERPL: 12.4 (ref 7–25)
CALCIUM SPEC-SCNC: 9.7 MG/DL (ref 8.6–10.5)
CHLORIDE SERPL-SCNC: 106 MMOL/L (ref 98–107)
CHOLEST SERPL-MCNC: 217 MG/DL (ref 0–200)
CO2 SERPL-SCNC: 26.3 MMOL/L (ref 22–29)
CREAT SERPL-MCNC: 1.21 MG/DL (ref 0.76–1.27)
GFR SERPL CREATININE-BSD FRML MDRD: 68 ML/MIN/1.73
GLOBULIN UR ELPH-MCNC: 2.9 GM/DL
GLUCOSE SERPL-MCNC: 102 MG/DL (ref 65–99)
HDLC SERPL-MCNC: 43 MG/DL (ref 40–60)
LDLC SERPL CALC-MCNC: 155 MG/DL (ref 0–100)
LDLC/HDLC SERPL: 3.61 {RATIO}
POTASSIUM SERPL-SCNC: 4.6 MMOL/L (ref 3.5–5.2)
PROT SERPL-MCNC: 7.2 G/DL (ref 6–8.5)
SODIUM SERPL-SCNC: 141 MMOL/L (ref 136–145)
TRIGL SERPL-MCNC: 94 MG/DL (ref 0–150)
URATE SERPL-MCNC: 7.7 MG/DL (ref 3.4–7)
VLDLC SERPL-MCNC: 18.8 MG/DL (ref 5–40)

## 2020-07-09 PROCEDURE — 99203 OFFICE O/P NEW LOW 30 MIN: CPT | Performed by: INTERNAL MEDICINE

## 2020-07-09 PROCEDURE — 84550 ASSAY OF BLOOD/URIC ACID: CPT | Performed by: INTERNAL MEDICINE

## 2020-07-09 PROCEDURE — 73620 X-RAY EXAM OF FOOT: CPT | Performed by: INTERNAL MEDICINE

## 2020-07-09 PROCEDURE — 80053 COMPREHEN METABOLIC PANEL: CPT | Performed by: INTERNAL MEDICINE

## 2020-07-09 PROCEDURE — 36415 COLL VENOUS BLD VENIPUNCTURE: CPT | Performed by: INTERNAL MEDICINE

## 2020-07-09 PROCEDURE — 80061 LIPID PANEL: CPT | Performed by: INTERNAL MEDICINE

## 2020-07-09 RX ORDER — NAPROXEN 500 MG/1
500 TABLET ORAL 2 TIMES DAILY WITH MEALS
Qty: 60 TABLET | Refills: 0 | Status: SHIPPED | OUTPATIENT
Start: 2020-07-09 | End: 2020-08-07

## 2020-07-11 DIAGNOSIS — M79.673 PAIN OF FOOT, UNSPECIFIED LATERALITY: Primary | ICD-10-CM

## 2020-08-07 RX ORDER — NAPROXEN 500 MG/1
TABLET ORAL
Qty: 60 TABLET | Refills: 3 | Status: SHIPPED | OUTPATIENT
Start: 2020-08-07

## 2021-04-16 ENCOUNTER — BULK ORDERING (OUTPATIENT)
Dept: CASE MANAGEMENT | Facility: OTHER | Age: 36
End: 2021-04-16

## 2021-04-16 DIAGNOSIS — Z23 IMMUNIZATION DUE: ICD-10-CM

## 2021-09-08 NOTE — PROGRESS NOTES
Requested Prescriptions     Pending Prescriptions Disp Refills    levothyroxine (SYNTHROID) 25 mcg tablet 90 Tablet 0     Sig: Take 1 Tablet by mouth nightly.      [employer], [address] Subjective   Torey Coy is a 35 y.o. male.  6 days left foot pain without any history of injury.  Pain is mainly first toe  Body mass index is 40.87 kg/m².  History of Present Illness pain left foot predominantly the MTP and first toe slightly laterally that plantar surface no increased walking new boots etc.'s setting for stress fracture negative gout history or family history for same.  Does have a history of kidney stones.  Overall fairly healthy individual does have family history of premature heart disease mainly the man and has family.  Father side I gather.  No history of gout we will get a uric acid on patient as well as x-ray today  Foot pain for 6d  No injury - gouy hx  Hx kidney stone sev  Drug allergies are none family history positive for heart disease primarily patient has a personal history of kidney stones non-smoker per surgical lithotripsy undefined eye surgery and a lesion or tumor removed from ear.  Review of Systems   Musculoskeletal:        Foot pain see present illness   All other systems reviewed and are negative.      Objective   Vitals:    07/09/20 0858   BP: 130/88   Pulse: 70   Temp: 97.7 °F (36.5 °C)   SpO2: 98%   Weight: 133 kg (293 lb)     Physical Exam   Constitutional: He appears well-developed and well-nourished.   HENT:   Head: Normocephalic and atraumatic.   Eyes: Pupils are equal, round, and reactive to light. Conjunctivae are normal.   Cardiovascular: Normal rate, regular rhythm and normal heart sounds.   Pulmonary/Chest: Effort normal and breath sounds normal.   Abdominal: Soft. Bowel sounds are normal.   Musculoskeletal:   Left foot mild discomfort palpation plantar surface left first MTP planus bowel discomfort the lateral more medial border of the first toe.  No swelling no increase heat and will discomfort with compression of metacarpal heads at the first toe.  Nontender with bowing of arch.  Left dorsalis pedis and posterior tib pulse are 2+ again no inflammation  or increased heat noted in foot.  Pain is more so with weightbearing by history   Neurological: He is alert.   Unremarkable gait and station   Skin: Skin is warm and dry.   Nursing note and vitals reviewed.      No results found for: INR    Procedures    Assessment/Plan   1.  Left foot pain plan naproxen 5 mg twice daily await pending uric acid if uric acid is negative will initiate orthopedic referral for patient    2.  Lipid screening await pending lab    3.  Strong family history for premature heart disease await pending lab further recommendations told patient is likely he would have lipids elevated will swell as for his risk factor associated with premature heart disease in the family    Much of this encounter note is an electronic transcription/translation of spoken language to printed text.  The electronic translation of spoken language may permit erroneous, or at times, nonsensical words or phrases to be inadvertently transcribed.  Although I have reviewed the note for such errors, some may still exist. If there are questions or for further clarification, please contact me.

## 2023-05-02 ENCOUNTER — OFFICE VISIT (OUTPATIENT)
Dept: INTERNAL MEDICINE | Age: 38
End: 2023-05-02
Payer: COMMERCIAL

## 2023-05-02 VITALS
OXYGEN SATURATION: 98 % | HEIGHT: 71 IN | TEMPERATURE: 97.5 F | DIASTOLIC BLOOD PRESSURE: 72 MMHG | HEART RATE: 80 BPM | SYSTOLIC BLOOD PRESSURE: 118 MMHG | BODY MASS INDEX: 38.69 KG/M2 | WEIGHT: 276.4 LBS

## 2023-05-02 DIAGNOSIS — R10.33 UMBILICAL PAIN: Primary | ICD-10-CM

## 2023-05-02 DIAGNOSIS — K42.9 UMBILICAL HERNIA WITHOUT OBSTRUCTION AND WITHOUT GANGRENE: ICD-10-CM

## 2023-05-02 NOTE — PROGRESS NOTES
"    I N T E R N A L  M E D I C I N E  Daphne Dawson, APRN    ENCOUNTER DATE:  05/02/2023    Christianpaulo RHETT Coy / 37 y.o. / male      CHIEF COMPLAINT / REASON FOR OFFICE VISIT     Establish Care and Abdominal Pain (Lump above his belly button, noticed it last night accompanied with pain)      ASSESSMENT & PLAN     Diagnoses and all orders for this visit:    1. Umbilical pain (Primary)  Assessment & Plan:  Suspect a Umbilical Hernia, CT of the Abdomen ordered. Possible GERD, Gallbladder, Pancreatitis.     Orders:  -     US Abdomen Complete  -     CBC w AUTO Differential; Future  -     Comprehensive metabolic panel; Future  -     Lipid panel; Future  -     Hemoglobin A1c; Future  -     TSH+Free T4; Future        Return in about 4 weeks (around 5/30/2023) for Annual physical.      VITAL SIGNS     Visit Vitals  /72 (BP Location: Right arm, Patient Position: Sitting, Cuff Size: Adult)   Pulse 80   Temp 97.5 °F (36.4 °C) (Temporal)   Ht 180.3 cm (71\")   Wt 125 kg (276 lb 6.4 oz)   SpO2 98%   BMI 38.55 kg/m²           BP Readings from Last 3 Encounters:   05/02/23 118/72   07/09/20 130/88   11/22/19 122/81     Wt Readings from Last 3 Encounters:   05/02/23 125 kg (276 lb 6.4 oz)   07/09/20 133 kg (293 lb)   11/22/19 112 kg (245 lb 13 oz)     Body mass index is 38.55 kg/m².    Blood pressure readings recorded on patient flowsheet:       View : No data to display.                     MEDICATIONS AT THE TIME OF OFFICE VISIT     Current Outpatient Medications on File Prior to Visit   Medication Sig Dispense Refill   • [DISCONTINUED] naproxen (NAPROSYN) 500 MG tablet TAKE 1 TABLET BY MOUTH TWICE DAILY WITH MEALS 60 tablet 3     No current facility-administered medications on file prior to visit.        HISTORY OF PRESENT ILLNESS     37 year old male being seen today to establish care with PCP, former patient of Dr Hammond. PMH includes Double vision, Pseudotumor cerebri syndrome, Obesity, Ureteral Stone with Lithotripsy. "     Patient seen today for concern for new palpable lump around umbilical area that causes pain at times. Patient denies any nausea, vomiting, diarrhea, or constipation. Patient states that it disappears when he lies down.   Patient with obesity and states that he started going to [facility], an [employer] program and has lost 30 lbs and states has some more weight loss to go.     Patient Care Team:  Daphne Dawson APRN as PCP - General (Family Medicine)    REVIEW OF SYSTEMS     Review of Systems   Constitutional: Negative for activity change, appetite change, chills and fever.   Respiratory: Negative for cough, chest tightness and shortness of breath.    Cardiovascular: Negative for chest pain and palpitations.   Gastrointestinal: Positive for abdominal pain. Negative for abdominal distention, constipation, diarrhea, nausea and vomiting.   Genitourinary: Negative.    Musculoskeletal: Negative.    Neurological: Negative for dizziness and headaches.   Psychiatric/Behavioral: Negative.           PHYSICAL EXAMINATION     Physical Exam  Constitutional:       General: He is not in acute distress.     Appearance: He is obese.   Cardiovascular:      Rate and Rhythm: Normal rate and regular rhythm.      Pulses: Normal pulses.      Heart sounds: Normal heart sounds.   Pulmonary:      Effort: Pulmonary effort is normal. No respiratory distress.      Breath sounds: Normal breath sounds.   Abdominal:      General: Bowel sounds are normal. There is no distension.      Palpations: Abdomen is soft.      Tenderness: There is abdominal tenderness. There is no right CVA tenderness, left CVA tenderness, guarding or rebound.      Hernia: A hernia is present.      Comments: Reducible hernia on examination with no evidence of incarceration.   Musculoskeletal:         General: Normal range of motion.   Skin:     General: Skin is warm and dry.   Neurological:      General: No focal deficit present.      Mental  Status: He is alert and oriented to person, place, and time. Mental status is at baseline.      Cranial Nerves: No cranial nerve deficit.   Psychiatric:         Mood and Affect: Mood normal.         Behavior: Behavior normal.         Thought Content: Thought content normal.         Judgment: Judgment normal.             REVIEWED DATA     Labs:     Lab Results   Component Value Date     07/09/2020    K 4.6 07/09/2020    CALCIUM 9.7 07/09/2020    AST 15 07/09/2020    ALT 20 07/09/2020    BUN 15 07/09/2020    CREATININE 1.21 07/09/2020    CREATININE 1.20 11/21/2019    EGFRIFNONA 68 07/09/2020       No results found for: HGBA1C    Lab Results   Component Value Date     (H) 07/09/2020    HDL 43 07/09/2020    TRIG 94 07/09/2020       No results found for: TSH, FREET4    Lab Results   Component Value Date    WBC 8.62 11/21/2019    HGB 15.2 11/21/2019     11/21/2019       No results found for: MALBCRERATIO         Imaging:           Medical Tests:           Summary of old records / correspondence / consultant report:           Request outside records:           TETE Fuentes

## 2023-05-03 ENCOUNTER — TELEPHONE (OUTPATIENT)
Dept: INTERNAL MEDICINE | Age: 38
End: 2023-05-03

## 2023-05-03 LAB
ALBUMIN SERPL-MCNC: 4.6 G/DL (ref 3.5–5.2)
ALBUMIN/GLOB SERPL: 2.1 G/DL
ALP SERPL-CCNC: 62 U/L (ref 39–117)
ALT SERPL-CCNC: 26 U/L (ref 1–41)
AST SERPL-CCNC: 19 U/L (ref 1–40)
BASOPHILS # BLD AUTO: 0.03 10*3/MM3 (ref 0–0.2)
BASOPHILS NFR BLD AUTO: 0.4 % (ref 0–1.5)
BILIRUB SERPL-MCNC: 1.2 MG/DL (ref 0–1.2)
BUN SERPL-MCNC: 13 MG/DL (ref 6–20)
BUN/CREAT SERPL: 14.9 (ref 7–25)
CALCIUM SERPL-MCNC: 9.9 MG/DL (ref 8.6–10.5)
CHLORIDE SERPL-SCNC: 105 MMOL/L (ref 98–107)
CHOLEST SERPL-MCNC: 199 MG/DL (ref 0–200)
CO2 SERPL-SCNC: 24.6 MMOL/L (ref 22–29)
CREAT SERPL-MCNC: 0.87 MG/DL (ref 0.76–1.27)
EGFRCR SERPLBLD CKD-EPI 2021: 114 ML/MIN/1.73
EOSINOPHIL # BLD AUTO: 0.1 10*3/MM3 (ref 0–0.4)
EOSINOPHIL NFR BLD AUTO: 1.3 % (ref 0.3–6.2)
ERYTHROCYTE [DISTWIDTH] IN BLOOD BY AUTOMATED COUNT: 13.2 % (ref 12.3–15.4)
GLOBULIN SER CALC-MCNC: 2.2 GM/DL
GLUCOSE SERPL-MCNC: 102 MG/DL (ref 65–99)
HBA1C MFR BLD: 5.5 % (ref 4.8–5.6)
HCT VFR BLD AUTO: 45.8 % (ref 37.5–51)
HDLC SERPL-MCNC: 40 MG/DL (ref 40–60)
HGB BLD-MCNC: 15.3 G/DL (ref 13–17.7)
IMM GRANULOCYTES # BLD AUTO: 0.09 10*3/MM3 (ref 0–0.05)
IMM GRANULOCYTES NFR BLD AUTO: 1.1 % (ref 0–0.5)
LDLC SERPL CALC-MCNC: 123 MG/DL (ref 0–100)
LYMPHOCYTES # BLD AUTO: 1.83 10*3/MM3 (ref 0.7–3.1)
LYMPHOCYTES NFR BLD AUTO: 23 % (ref 19.6–45.3)
MCH RBC QN AUTO: 30.1 PG (ref 26.6–33)
MCHC RBC AUTO-ENTMCNC: 33.4 G/DL (ref 31.5–35.7)
MCV RBC AUTO: 90.2 FL (ref 79–97)
MONOCYTES # BLD AUTO: 0.53 10*3/MM3 (ref 0.1–0.9)
MONOCYTES NFR BLD AUTO: 6.7 % (ref 5–12)
NEUTROPHILS # BLD AUTO: 5.36 10*3/MM3 (ref 1.7–7)
NEUTROPHILS NFR BLD AUTO: 67.5 % (ref 42.7–76)
NRBC BLD AUTO-RTO: 0 /100 WBC (ref 0–0.2)
PLATELET # BLD AUTO: 223 10*3/MM3 (ref 140–450)
POTASSIUM SERPL-SCNC: 4.4 MMOL/L (ref 3.5–5.2)
PROT SERPL-MCNC: 6.8 G/DL (ref 6–8.5)
RBC # BLD AUTO: 5.08 10*6/MM3 (ref 4.14–5.8)
SODIUM SERPL-SCNC: 143 MMOL/L (ref 136–145)
T4 FREE SERPL-MCNC: 1.34 NG/DL (ref 0.93–1.7)
TRIGL SERPL-MCNC: 206 MG/DL (ref 0–150)
TSH SERPL DL<=0.005 MIU/L-ACNC: 1.6 UIU/ML (ref 0.27–4.2)
VLDLC SERPL CALC-MCNC: 36 MG/DL (ref 5–40)
WBC # BLD AUTO: 7.94 10*3/MM3 (ref 3.4–10.8)

## 2023-05-03 NOTE — TELEPHONE ENCOUNTER
"  Caller: Torey Coy \"Miko\"    Relationship: Self    Best call back number: [phone]    Do you know the name of the person who called: PHIL    What was the call regarding: PATIENT WAS RETURNING A MISSED CALL REGARDING TEST RESULTS    Do you require a callback: YES, ATTEMPTED WARM TRANSFER, NO ANSWER.       "

## 2023-05-05 ENCOUNTER — PATIENT ROUNDING (BHMG ONLY) (OUTPATIENT)
Dept: INTERNAL MEDICINE | Age: 38
End: 2023-05-05
Payer: COMMERCIAL

## 2023-05-05 NOTE — PROGRESS NOTES
A [employer] message has been sent to the patient for PATIENT ROUNDING with [facility].

## 2023-05-09 ENCOUNTER — HOSPITAL ENCOUNTER (OUTPATIENT)
Dept: ULTRASOUND IMAGING | Facility: HOSPITAL | Age: 38
Discharge: HOME | End: 2023-05-09
Admitting: NURSE PRACTITIONER
Payer: COMMERCIAL

## 2023-05-09 PROCEDURE — 76705 ECHO EXAM OF ABDOMEN: CPT

## 2023-05-09 PROCEDURE — 76700 US EXAM ABDOM COMPLETE: CPT

## 2023-05-10 ENCOUNTER — TELEPHONE (OUTPATIENT)
Dept: INTERNAL MEDICINE | Age: 38
End: 2023-05-10

## 2023-05-10 DIAGNOSIS — R10.33 UMBILICAL PAIN: Primary | ICD-10-CM

## 2023-05-10 DIAGNOSIS — K42.9 UMBILICAL HERNIA WITHOUT OBSTRUCTION AND WITHOUT GANGRENE: ICD-10-CM

## 2023-05-10 NOTE — TELEPHONE ENCOUNTER
Caller: Torey Coy    Relationship: Self    Best call back number:[phone] UNTIL 5 THEN AVAILABLE ON CELL PHONE AFTER 6     Who are you requesting to speak with (clinical staff, provider,  specific staff member): PHIL    What was the call regarding: RETURNING PHONE CALL TO PHIL

## 2023-05-17 ENCOUNTER — OFFICE VISIT (OUTPATIENT)
Dept: SURGERY | Facility: CLINIC | Age: 38
End: 2023-05-17
Payer: COMMERCIAL

## 2023-05-17 VITALS
HEIGHT: 71 IN | SYSTOLIC BLOOD PRESSURE: 118 MMHG | WEIGHT: 271 LBS | DIASTOLIC BLOOD PRESSURE: 88 MMHG | BODY MASS INDEX: 37.94 KG/M2

## 2023-05-17 DIAGNOSIS — K42.9 UMBILICAL HERNIA WITHOUT OBSTRUCTION AND WITHOUT GANGRENE: Primary | ICD-10-CM

## 2023-05-17 PROCEDURE — 99204 OFFICE O/P NEW MOD 45 MIN: CPT | Performed by: PHYSICIAN ASSISTANT

## 2023-05-17 RX ORDER — CEFAZOLIN SODIUM IN 0.9 % NACL 3 G/100 ML
3 INTRAVENOUS SOLUTION, PIGGYBACK (ML) INTRAVENOUS ONCE
OUTPATIENT
Start: 2023-07-14 | End: 2023-05-17

## 2023-05-17 NOTE — PROGRESS NOTES
ASSESSMENT/PLAN:    This is a 38-year-old gentleman presenting with a symptomatic umbilical hernia which she wishes to have repaired.  I discussed surgery as an open umbilical hernia repair and he understands the nature of the procedure and the risks including but not limited to bleeding, infection, use of mesh, and recurrence.  All questions were answered at the time of this visit and they were willing to proceed with all recommendations.    CC:     Umbilical hernia    HPI:    This is a 38-year-old gentleman presenting to the office today at the request of TETE Fuentes for consultation.  Approximately 2 weeks ago he began to notice a bulge just above his navel that was very tender and remained this way for several hours before eventually becoming soft and going away.  Now whenever he is active he does notice a bulge and occasional discomfort.  He denies having nausea, vomiting, abdominal distention, abdominal pain elsewhere or any other complaints.    ENDOSCOPY:   • Colonoscopy: None    SOCIAL HISTORY:   • Denies tobacco use  • Occasional alcohol use    FAMILY HISTORY:    • Colorectal cancer: None    PREVIOUS ABDOMINAL SURGERY    • None    OTHER SURGERY  Past Surgical History:   Procedure Laterality Date   • EXTRACORPOREAL SHOCKWAVE LITHOTRIPSY (ESWL), STENT INSERTION/REMOVAL Left 11/22/2019    Procedure: EXTRACORPOREAL SHOCKWAVE LITHOTRIPSY WITH CYSTOSCOPY, AND  LEFT STENT INSERTION;  Surgeon: Joseph Ramos MD;  [place] [facility] [place]  Service: Urology   • TUMOR REMOVAL      LT ear as a child       PAST MEDICAL HISTORY:    Past Medical History:   Diagnosis Date   • Headache Since i was a kid    They fo not happen as often now that i have been eating better   • HL (hearing loss) Since i was a kid    Left ear has had multiple surgeries   • Kidney stone        MEDICATIONS:     Current Outpatient Medications:   •  Unable to find, Multiple vitamins-patient unsure of names, Disp: , Rfl:     ALLERGIES:  "  No Known Allergies    PHYSICAL EXAM:   • Constitutional: Well-developed well-nourished, no acute distress  • Vital signs:   o Height 71\"  o Weight 271  o BMI 37.8 Discussed with patient increased perioperative risks associated with obesity including increased risks of DVT, infection, seromas, poor wound healing and hernias (with abdominal surgery).  • Neck: Supple, no palpable mass, trachea midline  • Respiratory: Clear to auscultation, normal inspiratory effort  • Cardiovascular: Regular rate, no murmur, no carotid bruit  • Gastrointestinal: Umbilical hernia, easily reducible, no epigastric hernia, remainder of abdomen is soft, nontender  • Psychiatric: Alert and oriented ×3, normal affect         Andres Doll PA-C    "

## 2023-05-30 ENCOUNTER — OFFICE VISIT (OUTPATIENT)
Dept: INTERNAL MEDICINE | Age: 38
End: 2023-05-30

## 2023-05-30 VITALS
SYSTOLIC BLOOD PRESSURE: 116 MMHG | HEART RATE: 69 BPM | BODY MASS INDEX: 36.46 KG/M2 | WEIGHT: 260.4 LBS | TEMPERATURE: 96.9 F | DIASTOLIC BLOOD PRESSURE: 70 MMHG | HEIGHT: 71 IN | OXYGEN SATURATION: 97 %

## 2023-05-30 DIAGNOSIS — K42.9 UMBILICAL HERNIA WITHOUT OBSTRUCTION AND WITHOUT GANGRENE: ICD-10-CM

## 2023-05-30 DIAGNOSIS — Z00.00 ANNUAL VISIT FOR GENERAL ADULT MEDICAL EXAMINATION WITHOUT ABNORMAL FINDINGS: Primary | ICD-10-CM

## 2023-05-30 PROBLEM — H47.099 OPTIC NERVE DISORDER: Status: ACTIVE | Noted: 2023-05-30

## 2023-05-30 NOTE — PROGRESS NOTES
"    I N T E R N A L  M E D I C I N E    TETE Fuentes       ENCOUNTER DATE:  2023    Torey Coy / 38 y.o. / male    CHIEF COMPLAINT     Annual Exam      VITALS     Vitals:    23 1429   BP: 116/70   Cuff Size: Large Adult   Pulse: 69   Temp: 96.9 °F (36.1 °C)   TempSrc: Temporal   SpO2: 97%   Weight: 118 kg (260 lb 6.4 oz)   Height: 180.3 cm (71\")       BP Readings from Last 3 Encounters:   23 116/70   23 118/88   23 118/72     Wt Readings from Last 3 Encounters:   23 118 kg (260 lb 6.4 oz)   23 123 kg (271 lb)   23 125 kg (276 lb 6.4 oz)      Body mass index is 36.32 kg/m².    [unfilled]      MEDICATIONS     Current Outpatient Medications on File Prior to Visit   Medication Sig Dispense Refill   • NON FORMULARY 1 dose 3 (Three) Times a Day. RENAVEN     • NON FORMULARY 1 dose 3 (Three) Times a Day. CARDIOVEN     • Unable to find Multiple vitamins-patient unsure of names       No current facility-administered medications on file prior to visit.         HISTORY OF PRESENT ILLNESS     Torey presents for annual health maintenance visit.      · General health: good  · Lifestyle:  · Attempting to lose weight?: Yes   · Diet: eats decently  · Exercise: does not exercise  · Tobacco: Never used   · Alcohol: does not drink  · Work: Full-time  · Reproductive health:  · Sexually active?: Yes   · Sexual problems?: No problems  · Concern for STD?: No    · Sees Gynecologist?: No   · Heaven/Postmenopausal?: No   · Depression Screenin/30/2023     2:28 PM   PHQ-2/PHQ-9 Depression Screening   Little Interest or Pleasure in Doing Things 0-->not at all   Feeling Down, Depressed or Hopeless 0-->not at all   PHQ-9: Brief Depression Severity Measure Score 0         PHQ-2: 0 (Not depressed)   PHQ-9: 0 (Negative screening for depression)    Patient Care Team:  Daphne Dawson APRN as PCP - General (Family Medicine)      ALLERGIES  No Known Allergies     PFSH:     The " following portions of the patient's history were reviewed and updated as appropriate: Allergies / Current Medications / Past Medical History / Surgical History / Social History / Family History    PROBLEM LIST   Patient Active Problem List   Diagnosis   • Pseudotumor cerebri syndrome   • Double vision   • Umbilical pain   • Umbilical hernia without obstruction and without gangrene   • Optic nerve disorder       PAST MEDICAL HISTORY  Past Medical History:   Diagnosis Date   • Headache Since i was a kid    They fo not happen as often now that i have been eating better   • HL (hearing loss) Since i was a kid    Left ear has had multiple surgeries   • Kidney stone        SURGICAL HISTORY  Past Surgical History:   Procedure Laterality Date   • EXTRACORPOREAL SHOCKWAVE LITHOTRIPSY (ESWL), STENT INSERTION/REMOVAL Left 11/22/2019    Procedure: EXTRACORPOREAL SHOCKWAVE LITHOTRIPSY WITH CYSTOSCOPY, AND  LEFT STENT INSERTION;  Surgeon: Joseph Ramos MD;  [place] [facility] [place] [facility] [place]  Service: Urology   • EYE SURGERY     • TUMOR REMOVAL      LT ear as a child       SOCIAL HISTORY  Social History     Socioeconomic History   • Marital status:    Tobacco Use   • Smoking status: Never   • Smokeless tobacco: Never   Vaping Use   • Vaping Use: Never used   Substance and Sexual Activity   • Alcohol use: Yes     Comment: I drink maybe twice a year and usually social drinking   • Drug use: Never   • Sexual activity: Yes     Partners: Female       FAMILY HISTORY  Family History   Problem Relation Age of Onset   • No Known Problems Mother    • Hearing loss Father         The [place] family has always been hard to hear my whole life   • Heart disease Paternal Grandfather         Had multiple bypasess and passed away from heart attack       IMMUNIZATION HISTORY  Immunization History   Administered Date(s) Administered   • COVID-19 (PFIZER) Purple Cap Monovalent 08/06/2021, 08/28/2021   • Covid-19 (Pfizer) Gray Cap Monovalent  03/26/2022   • Influenza, Unspecified 01/13/2021   • Tdap 03/08/2011, 10/29/2022         REVIEW OF SYSTEMS     Review of Systems   Constitutional: Negative for activity change, appetite change, chills, fever and unexpected weight change.   HENT: Negative.    Respiratory: Negative.  Negative for cough, chest tightness and shortness of breath.    Cardiovascular: Negative.  Negative for chest pain and palpitations.   Gastrointestinal: Negative.  Negative for abdominal distention, abdominal pain, constipation, diarrhea, nausea and vomiting.   Genitourinary: Negative.         Umbilical Hernia present without obstruction or gangrene   Musculoskeletal: Negative.    Skin: Negative.    Neurological: Negative.  Negative for dizziness and headaches.   Hematological: Negative.  Negative for adenopathy. Does not bruise/bleed easily.   Psychiatric/Behavioral: Negative.    All other systems reviewed and are negative.      PHYSICAL EXAMINATION     Physical Exam  Constitutional:       General: He is not in acute distress.     Appearance: Normal appearance. He is obese.   HENT:      Head: Normocephalic.      Right Ear: Tympanic membrane, ear canal and external ear normal. There is no impacted cerumen.      Left Ear: Tympanic membrane, ear canal and external ear normal. There is no impacted cerumen.      Nose: Nose normal. No congestion or rhinorrhea.      Mouth/Throat:      Mouth: Mucous membranes are moist.      Pharynx: Oropharynx is clear. No oropharyngeal exudate or posterior oropharyngeal erythema.   Eyes:      General: No scleral icterus.     Conjunctiva/sclera: Conjunctivae normal.      Pupils: Pupils are equal, round, and reactive to light.   Cardiovascular:      Rate and Rhythm: Normal rate and regular rhythm.      Pulses: Normal pulses.      Heart sounds: Normal heart sounds.   Pulmonary:      Effort: Pulmonary effort is normal.      Breath sounds: Normal breath sounds.   Abdominal:      General: Bowel sounds are normal.  There is no distension.      Palpations: Abdomen is soft.      Tenderness: There is no abdominal tenderness. There is no right CVA tenderness or left CVA tenderness.      Hernia: A hernia is present.   Musculoskeletal:         General: No swelling or tenderness. Normal range of motion.      Cervical back: Normal range of motion and neck supple. No rigidity.   Lymphadenopathy:      Cervical: No cervical adenopathy.   Skin:     General: Skin is warm and dry.      Capillary Refill: Capillary refill takes less than 2 seconds.   Neurological:      General: No focal deficit present.      Mental Status: He is alert and oriented to person, place, and time. Mental status is at baseline.   Psychiatric:         Mood and Affect: Mood normal.         Behavior: Behavior normal.         Thought Content: Thought content normal.         Judgment: Judgment normal.         REVIEWED DATA      Labs:    Lab Results   Component Value Date     05/02/2023    K 4.4 05/02/2023    CALCIUM 9.9 05/02/2023    AST 19 05/02/2023    ALT 26 05/02/2023    BUN 13 05/02/2023    CREATININE 0.87 05/02/2023    CREATININE 1.21 07/09/2020    CREATININE 1.20 11/21/2019    EGFRIFNONA 68 07/09/2020       Lab Results   Component Value Date    GLUCOSE 102 (H) 05/02/2023    HGBA1C 5.50 05/02/2023    TSH 1.600 05/02/2023    FREET4 1.34 05/02/2023       Lab Results   Component Value Date     (H) 05/02/2023    HDL 40 05/02/2023    TRIG 206 (H) 05/02/2023       No results found for: [id number]     Lab Results   Component Value Date    WBC 7.94 05/02/2023    HGB 15.3 05/02/2023    MCV 90.2 05/02/2023     05/02/2023       Lab Results   Component Value Date    GLUCOSEU Negative 11/21/2019    BLOODU Trace (A) 11/21/2019    NITRITEU Negative 11/21/2019    LEUKOCYTESUR Trace (A) 11/21/2019        No results found for: HEPCVIRUSABY    Imaging:        Medical Tests:        ASSESSMENT & PLAN     ANNUAL WELLNESS EXAM / PHYSICAL     Other medical problems  addressed today:  @A1@    Summary/Discussion:     •     Next Appointment with me: Visit date not found    Return in about 6 months (around 11/30/2023) for Next scheduled follow up.      HEALTHCARE MAINTENANCE ISSUES     Cancer Screening:  · Colon: Initial/Next screening at age: 50  · Repeat colon cancer screening: N/A at this time  · Skin: Monthly self skin examination, annual exam by health professional  · Lung: Does not meet criteria for lung cancer screening.   · Other:    Screening Labs & Tests:  · Lab results reviewed & discussed with the patient or test orders placed today.  · EKG:  · CV Screening: No special screening needed  · DEXA (65+ or postmenopausal with risk factors):   · HEP C (If born [birth year], or risk factors): Will check next time  · Other:     Immunization/Vaccinations (to be given today unless deferred by patient)  · Influenza: Patient had the flu shot this season  · Hepatitis A: Not needed at this time  · Hepatitis B: Not needed at this time  · Tetanus/Pertussis: Up to date  · Pneumococcal: Not needed at this time  · Shingles: Not needed at this time  · COVID: Had the primary vaccine series and 1st/2nd boosters    Lifestyle Counseling:  · Lifestyle Modifications: Attempt to lose weight, Continue good lifestyle choices/modifications, Begin progressive aerobic exercise program 3-5 days a week, Follow a low fat, low cholesterol diet, Decrease or avoid alcohol intake and Reduce exposure to stress if possible  · Safety Issues: Always wear seatbelt, Avoid texting while driving   · Use sunscreen, regular skin examination  · Recommended annual dental/vision examination.  · Emotional/Stress/Sleep: Reviewed and  given when appropriate      Health Maintenance   Topic Date Due   • HEPATITIS C SCREENING  Never done   • COVID-19 Vaccine (4 - Booster for Pfizer series) 05/21/2022   • INFLUENZA VACCINE  08/01/2023   • ANNUAL PHYSICAL  05/30/2024   • TDAP/TD VACCINES (3 - Td or Tdap) 10/29/2032   •  Pneumococcal Vaccine 0-64  Aged Out

## 2023-05-31 PROBLEM — Z00.00 ANNUAL VISIT FOR GENERAL ADULT MEDICAL EXAMINATION WITHOUT ABNORMAL FINDINGS: Status: ACTIVE | Noted: 2023-05-31

## 2023-05-31 NOTE — PROGRESS NOTES
"    I N T E R N A L  M E D I C I N E    TETE Fuentes       ENCOUNTER DATE:  2023    Torey Coy / 38 y.o. / male    CHIEF COMPLAINT     Annual Exam      VITALS     Vitals:    23 1429   BP: 116/70   Cuff Size: Large Adult   Pulse: 69   Temp: 96.9 °F (36.1 °C)   TempSrc: Temporal   SpO2: 97%   Weight: 118 kg (260 lb 6.4 oz)   Height: 180.3 cm (71\")       BP Readings from Last 3 Encounters:   23 116/70   23 118/88   23 118/72     Wt Readings from Last 3 Encounters:   23 118 kg (260 lb 6.4 oz)   23 123 kg (271 lb)   23 125 kg (276 lb 6.4 oz)      Body mass index is 36.32 kg/m².    [unfilled]      MEDICATIONS     Current Outpatient Medications on File Prior to Visit   Medication Sig Dispense Refill   • NON FORMULARY 1 dose 3 (Three) Times a Day. RENAVEN     • NON FORMULARY 1 dose 3 (Three) Times a Day. CARDIOVEN     • Unable to find Multiple vitamins-patient unsure of names       No current facility-administered medications on file prior to visit.         HISTORY OF PRESENT ILLNESS     Torey presents for annual health maintenance visit.      · General health: good  · Lifestyle:  · Attempting to lose weight?: Yes   · Diet: eats decently  · Exercise: does not exercise  · Tobacco: Never used   · Alcohol: does not drink  · Work: Full-time  · Reproductive health:  · Sexually active?: Yes   · Sexual problems?: No problems  · Concern for STD?: No    · Sees Gynecologist?: No   · Heaven/Postmenopausal?: No   · Depression Screenin/30/2023     2:28 PM   PHQ-2/PHQ-9 Depression Screening   Little Interest or Pleasure in Doing Things 0-->not at all   Feeling Down, Depressed or Hopeless 0-->not at all   PHQ-9: Brief Depression Severity Measure Score 0         PHQ-2: 0 (Not depressed)   PHQ-9: 0 (Negative screening for depression)    Patient Care Team:  Daphne Dawson APRN as PCP - General (Family Medicine)      ALLERGIES  No Known Allergies     PFSH:     The " following portions of the patient's history were reviewed and updated as appropriate: Allergies / Current Medications / Past Medical History / Surgical History / Social History / Family History    PROBLEM LIST   Patient Active Problem List   Diagnosis   • Pseudotumor cerebri syndrome   • Double vision   • Umbilical pain   • Umbilical hernia without obstruction and without gangrene   • Optic nerve disorder   • Annual visit for general adult medical examination without abnormal findings       PAST MEDICAL HISTORY  Past Medical History:   Diagnosis Date   • Headache Since i was a kid    They fo not happen as often now that i have been eating better   • HL (hearing loss) Since i was a kid    Left ear has had multiple surgeries   • Kidney stone        SURGICAL HISTORY  Past Surgical History:   Procedure Laterality Date   • EXTRACORPOREAL SHOCKWAVE LITHOTRIPSY (ESWL), STENT INSERTION/REMOVAL Left 11/22/2019    Procedure: EXTRACORPOREAL SHOCKWAVE LITHOTRIPSY WITH CYSTOSCOPY, AND  LEFT STENT INSERTION;  Surgeon: Joseph Ramos MD;  [place] [facility] [place] [facility] [place]  Service: Urology   • EYE SURGERY     • TUMOR REMOVAL      LT ear as a child       SOCIAL HISTORY  Social History     Socioeconomic History   • Marital status:    Tobacco Use   • Smoking status: Never   • Smokeless tobacco: Never   Vaping Use   • Vaping Use: Never used   Substance and Sexual Activity   • Alcohol use: Yes     Comment: I drink maybe twice a year and usually social drinking   • Drug use: Never   • Sexual activity: Yes     Partners: Female       FAMILY HISTORY  Family History   Problem Relation Age of Onset   • No Known Problems Mother    • Hearing loss Father         The [place] family has always been hard to hear my whole life   • Heart disease Paternal Grandfather         Had multiple bypasess and passed away from heart attack       IMMUNIZATION HISTORY  Immunization History   Administered Date(s) Administered   • COVID-19 (PFIZER) Purple  Cap Monovalent 08/06/2021, 08/28/2021   • Covid-19 (Pfizer) Gray Cap Monovalent 03/26/2022   • Influenza, Unspecified 01/13/2021   • Tdap 03/08/2011, 10/29/2022         REVIEW OF SYSTEMS     Review of Systems   Constitutional: Negative for activity change, appetite change, chills, fever and unexpected weight change.   HENT: Negative.    Respiratory: Negative.  Negative for cough, chest tightness and shortness of breath.    Cardiovascular: Negative.  Negative for chest pain and palpitations.   Gastrointestinal: Negative.  Negative for abdominal distention, abdominal pain, constipation, diarrhea, nausea and vomiting.   Genitourinary: Negative.         Umbilical Hernia present without obstruction or gangrene   Musculoskeletal: Negative.    Skin: Negative.    Neurological: Negative.  Negative for dizziness and headaches.   Hematological: Negative.  Negative for adenopathy. Does not bruise/bleed easily.   Psychiatric/Behavioral: Negative.    All other systems reviewed and are negative.      PHYSICAL EXAMINATION     Physical Exam  Constitutional:       General: He is not in acute distress.     Appearance: Normal appearance. He is obese.   HENT:      Head: Normocephalic.      Right Ear: Tympanic membrane, ear canal and external ear normal. There is no impacted cerumen.      Left Ear: Tympanic membrane, ear canal and external ear normal. There is no impacted cerumen.      Nose: Nose normal. No congestion or rhinorrhea.      Mouth/Throat:      Mouth: Mucous membranes are moist.      Pharynx: Oropharynx is clear. No oropharyngeal exudate or posterior oropharyngeal erythema.   Eyes:      General: No scleral icterus.     Conjunctiva/sclera: Conjunctivae normal.      Pupils: Pupils are equal, round, and reactive to light.   Cardiovascular:      Rate and Rhythm: Normal rate and regular rhythm.      Pulses: Normal pulses.      Heart sounds: Normal heart sounds.   Pulmonary:      Effort: Pulmonary effort is normal.      Breath  sounds: Normal breath sounds.   Abdominal:      General: Bowel sounds are normal. There is no distension.      Palpations: Abdomen is soft.      Tenderness: There is no abdominal tenderness. There is no right CVA tenderness or left CVA tenderness.      Hernia: A hernia is present.   Musculoskeletal:         General: No swelling or tenderness. Normal range of motion.      Cervical back: Normal range of motion and neck supple. No rigidity.   Lymphadenopathy:      Cervical: No cervical adenopathy.   Skin:     General: Skin is warm and dry.      Capillary Refill: Capillary refill takes less than 2 seconds.   Neurological:      General: No focal deficit present.      Mental Status: He is alert and oriented to person, place, and time. Mental status is at baseline.   Psychiatric:         Mood and Affect: Mood normal.         Behavior: Behavior normal.         Thought Content: Thought content normal.         Judgment: Judgment normal.         REVIEWED DATA      Labs:    Lab Results   Component Value Date     05/02/2023    K 4.4 05/02/2023    CALCIUM 9.9 05/02/2023    AST 19 05/02/2023    ALT 26 05/02/2023    BUN 13 05/02/2023    CREATININE 0.87 05/02/2023    CREATININE 1.21 07/09/2020    CREATININE 1.20 11/21/2019    EGFRIFNONA 68 07/09/2020       Lab Results   Component Value Date    GLUCOSE 102 (H) 05/02/2023    HGBA1C 5.50 05/02/2023    TSH 1.600 05/02/2023    FREET4 1.34 05/02/2023       Lab Results   Component Value Date     (H) 05/02/2023    HDL 40 05/02/2023    TRIG 206 (H) 05/02/2023       No results found for: [id number]     Lab Results   Component Value Date    WBC 7.94 05/02/2023    HGB 15.3 05/02/2023    MCV 90.2 05/02/2023     05/02/2023       Lab Results   Component Value Date    GLUCOSEU Negative 11/21/2019    BLOODU Trace (A) 11/21/2019    NITRITEU Negative 11/21/2019    LEUKOCYTESUR Trace (A) 11/21/2019        No results found for: HEPCVIRUSABY    Imaging:        Medical  Tests:        ASSESSMENT & PLAN     ANNUAL WELLNESS EXAM / PHYSICAL     1. Annual visit for general adult medical examination without abnormal findings    2. Umbilical hernia without obstruction and without gangrene  Patient scheduled for Umbilical Hernia repair in July, 2023    Return in about 6 months (around 11/30/2023) for Next scheduled follow up.      HEALTHCARE MAINTENANCE ISSUES     Cancer Screening:  · Colon: Initial/Next screening at age: 50  · Repeat colon cancer screening: N/A at this time  · Skin: Monthly self skin examination, annual exam by health professional  · Lung: Does not meet criteria for lung cancer screening.   · Other:    Screening Labs & Tests:  · Lab results reviewed & discussed with the patient or test orders placed today.  · EKG:  · CV Screening: No special screening needed  · DEXA (65+ or postmenopausal with risk factors):   · HEP C (If born 3792[birth year]10[birth year], or risk factors): Will check next time  · Other:     Immunization/Vaccinations (to be given today unless deferred by patient)  · Influenza: Patient had the flu shot this season  · Hepatitis A: Not needed at this time  · Hepatitis B: Not needed at this time  · Tetanus/Pertussis: Up to date  · Pneumococcal: Not needed at this time  · Shingles: Not needed at this time  · COVID: Had the primary vaccine series and 1st/2nd boosters    Lifestyle Counseling:  · Lifestyle Modifications: Attempt to lose weight, Continue good lifestyle choices/modifications, Begin progressive aerobic exercise program 3-5 days a week, Follow a low fat, low cholesterol diet, Decrease or avoid alcohol intake and Reduce exposure to stress if possible  · Safety Issues: Always wear seatbelt, Avoid texting while driving   · Use sunscreen, regular skin examination  · Recommended annual dental/vision examination.  · Emotional/Stress/Sleep: Reviewed and  given when appropriate      Health Maintenance   Topic Date Due   • HEPATITIS C SCREENING  Never done   • COVID-19  Vaccine (4 - Booster for Pfizer series) 05/21/2022   • INFLUENZA VACCINE  08/01/2023   • ANNUAL PHYSICAL  05/30/2024   • TDAP/TD VACCINES (3 - Td or Tdap) 10/29/2032   • Pneumococcal Vaccine 0-64  Aged Out

## 2023-07-13 PROBLEM — I45.6 PRE-EXCITATION SYNDROME: Status: ACTIVE | Noted: 2023-07-13

## 2023-07-13 PROBLEM — Z01.810 PREOP CARDIOVASCULAR EXAM: Status: ACTIVE | Noted: 2023-05-31

## 2023-07-13 PROBLEM — R94.31 ABNORMAL EKG: Status: ACTIVE | Noted: 2023-07-13

## 2023-07-28 ENCOUNTER — OFFICE VISIT (OUTPATIENT)
Dept: CARDIOLOGY | Facility: CLINIC | Age: 38
End: 2023-07-28
Payer: COMMERCIAL

## 2023-07-28 VITALS
DIASTOLIC BLOOD PRESSURE: 76 MMHG | HEART RATE: 62 BPM | WEIGHT: 262 LBS | BODY MASS INDEX: 36.68 KG/M2 | HEIGHT: 71 IN | SYSTOLIC BLOOD PRESSURE: 116 MMHG

## 2023-07-28 DIAGNOSIS — R94.31 ABNORMAL EKG: ICD-10-CM

## 2023-07-28 DIAGNOSIS — Z01.810 PREOP CARDIOVASCULAR EXAM: Primary | ICD-10-CM

## 2023-07-28 PROCEDURE — 99214 OFFICE O/P EST MOD 30 MIN: CPT

## 2023-07-28 NOTE — PROGRESS NOTES
Subjective:        Torey Coy is a 38 y.o. male who here for follow up    Chief Complaint   Patient presents with    Results     TEST RESULTS       HPI    This is a 38-year-old male with asthma, migraines.  He follows up in office today for surgical clearance for hernia repair.  He had abnormal ECG so his surgery was canceled he was seen on 7/13/2023 for cardiac clearance in which testing was ordered.    Echo 7/18/2023 EF 61 to 65%, normal LV function.  Stress test 7/17/2023 was a normal ECG stress test. Holter monitor 7/17/2023 was normal sinus rhythm with rare PVC.  Average heart rate 73.    The following portions of the patient's history were reviewed and updated as appropriate: allergies, current medications, past family history, past medical history, past social history, past surgical history and problem list.    Past Medical History:   Diagnosis Date    Asthma     history as a child    Headache Since i was a kid    They fo not happen as often now that i have been eating better    HL (hearing loss) Since i was a kid    Left ear has had multiple surgeries    Kidney stone     Migraine     treats with tylenol or advil and goes to sleep    Umbilical hernia          reports that he has never smoked. He has never used smokeless tobacco. He reports current alcohol use. He reports that he does not use drugs.     Family History   Problem Relation Age of Onset    Heart disease Mother     Heart failure Mother     Hearing loss Father         The [place] family has always been hard to hear my whole life    Congenital heart disease Sister     Heart disease Paternal Grandfather         Had multiple bypasess and passed away from heart attack    Malig Hyperthermia Neg Hx        ROS     Review of Systems  Constitutional: No wt loss, fever, fatigue  Gastrointestinal: No nausea, abdominal pain  Behavioral/Psych: No insomnia or anxiety  Cardiovascular no chest pain or shortness of breath      Objective:           Vitals  and nursing note reviewed.   Constitutional:       Appearance: Well-developed.   HENT:      Head: Normocephalic.      Right Ear: External ear normal.      Left Ear: External ear normal.   Neck:      Vascular: No JVD.   Pulmonary:      Effort: Pulmonary effort is normal. No respiratory distress.      Breath sounds: Normal breath sounds. No stridor. No rales.   Cardiovascular:      Normal rate. Regular rhythm.      No gallop.    Pulses:     Intact distal pulses.   Edema:     Peripheral edema absent.   Abdominal:      General: Bowel sounds are normal. There is no distension.      Palpations: Abdomen is soft.      Tenderness: There is no abdominal tenderness. There is no guarding.   Musculoskeletal: Normal range of motion.         General: No tenderness.      Cervical back: Normal range of motion. Skin:     General: Skin is warm.   Neurological:      Mental Status: Alert and oriented to person, place, and time.      Deep Tendon Reflexes: Reflexes are normal and symmetric.   Psychiatric:         Judgment: Judgment normal.       Procedures    Interpretation Summary         A normal monitor study.    Torey Coy monitored for 22 h 3 min starting on 07/17/2023.  Primary rhythm was Sinus Rhythm.  The average heart rate, excluding ectopy, was 73 BPM with a minumim of 55 BPM  on Day 2 and a maximum of 120 BPM on Day 1. PVC: [place] was <0.01 %, max count per 24 hours 9,2 disparate morphologies    NSR WITH RARE PVC    Interpretation Summary         No ECG evidence of myocardial ischemia.    Negative clinical evidence of myocardial ischemia.    Findings consistent with a normal ECG stress test.    No ECG evidence of myocardial ischemia. Negative clinical evidence of myocardial ischemia. Findings consistent with a normal ECG stress test    Interpretation Summary         Left ventricular ejection fraction appears to be 61 - 65%.    Left ventricular diastolic function was normal.    Estimated right ventricular systolic  pressure from tricuspid regurgitation is normal (<35 mmHg).         Current Outpatient Medications:     acetaminophen (TYLENOL) 500 MG tablet, Take 2-3 tablets by mouth Every 6 (Six) Hours As Needed for Mild Pain., Disp: , Rfl:     ibuprofen (ADVIL,MOTRIN) 200 MG tablet, Take 2-3 tablets by mouth Every 6 (Six) Hours As Needed for Mild Pain., Disp: , Rfl:      Assessment:        Patient Active Problem List   Diagnosis    Pseudotumor cerebri syndrome    Double vision    Umbilical pain    Umbilical hernia without obstruction and without gangrene    Optic nerve disorder    Preop cardiovascular exam    Abnormal EKG    Pre-excitation syndrome               Plan:   Cardiac clearance for umbilical hernia surgery: clear for surgery with nonmodifiable risk factors    has been seen and examined with no clinical signs of angina or CHF , patient is cleared for hernia surgery with non-modifiable risk factors. Patient has been advised to take cardiac meds with sip of water on the day of surgery.    Please use beta blocker for tachycardia preoperatively. Anticoagulation to be managed appropriately    Watch for chest pain, shortness of breath, palpitations, arrhythmias, and significant change in the blood pressure preoperatively. Please check EKG pre-op and postop if any questions, notify us if any change in patient's cardiovascular conditions.    2.  Abnormal ECG: Ischemic work-up as above.    Stress testing carries 85% specificity/sensitivity/cardiac workup has been explained, and does not rule out coronary artery disease or future events, continue to emphasize on risk reductions for coronary artery disease.  Explained if symptoms continue please go to ER, and further w/p will be required.                 No diagnosis found.    There are no diagnoses linked to this encounter.    COUNSELING: terra Townsendeling was given to patient for the following topics: diagnostic results, risk factor reductions, impressions,  risks and benefits of treatment options and importance of treatment compliance .       SMOKING COUNSELING: denies    Clear for surgery,  Follow up in 1 year    Sincerely,   TETE Moscoso  [facility]  07/28/23  08:42 EDT    EMR Dragon/Transcription disclaimer:   Much of this encounter note is an electronic transcription/translation of spoken language to printed text. The electronic translation of spoken language may permit erroneous, or at times, nonsensical words or phrases to be inadvertently transcribed; Although I have reviewed the note for such errors, some may still exist.

## 2023-07-28 NOTE — LETTER
July 28, 2023     Patient: Stephane Coy   YOB: 1985   Date of Visit: 7/28/2023       STEPHANE  has been seen and examined with no clinical signs of angina or CHF , patient is cleared for HERNIA surgery with non-modifiable risk factors. Patient has been advised to take cardiac meds with sip of water on the day of surgery.    Please use beta blocker for tachycardia preoperatively. Anticoagulation to be managed appropriately    Watch for chest pain, shortness of breath, palpitations, arrhythmias, and significant change in the blood pressure preoperatively. Please check EKG pre-op and postop if any questions, notify us if any change in patient's cardiovascular conditions.          Sincerely,        TETE Moscoso    CC:   No Recipients

## 2023-08-01 ENCOUNTER — TELEPHONE (OUTPATIENT)
Dept: SURGERY | Facility: CLINIC | Age: 38
End: 2023-08-01
Payer: COMMERCIAL

## 2023-11-01 ENCOUNTER — OFFICE VISIT (OUTPATIENT)
Dept: INTERNAL MEDICINE | Age: 38
End: 2023-11-01
Payer: MEDICAID

## 2023-11-01 ENCOUNTER — HOSPITAL ENCOUNTER (OUTPATIENT)
Facility: HOSPITAL | Age: 38
Discharge: HOME | End: 2023-11-01
Payer: COMMERCIAL

## 2023-11-01 VITALS
SYSTOLIC BLOOD PRESSURE: 132 MMHG | TEMPERATURE: 97.6 F | HEART RATE: 68 BPM | HEIGHT: 71 IN | BODY MASS INDEX: 38.92 KG/M2 | OXYGEN SATURATION: 97 % | DIASTOLIC BLOOD PRESSURE: 80 MMHG | WEIGHT: 278 LBS

## 2023-11-01 DIAGNOSIS — M25.511 CHRONIC RIGHT SHOULDER PAIN: ICD-10-CM

## 2023-11-01 DIAGNOSIS — K61.0 PERIANAL ABSCESS: ICD-10-CM

## 2023-11-01 DIAGNOSIS — Z76.89 ENCOUNTER TO ESTABLISH CARE: Primary | ICD-10-CM

## 2023-11-01 DIAGNOSIS — E78.00 PURE HYPERCHOLESTEROLEMIA: ICD-10-CM

## 2023-11-01 DIAGNOSIS — G89.29 CHRONIC RIGHT SHOULDER PAIN: ICD-10-CM

## 2023-11-01 PROCEDURE — 73030 X-RAY EXAM OF SHOULDER: CPT

## 2023-11-01 NOTE — PROGRESS NOTES
"    I N T E R N A L  M E D I C I N E  Estephania Walker, TETE    ENCOUNTER DATE:  11/01/2023    Torey Coy / 38 y.o. / male      CHIEF COMPLAINT / REASON FOR OFFICE VISIT     Establish Care and Shoulder Pain (Right )      ASSESSMENT & PLAN     Diagnoses and all orders for this visit:    1. Encounter to establish care (Primary)    2. Chronic right shoulder pain  -     Ambulatory Referral to Physical Therapy Evaluate and treat  -     XR Shoulder 2+ View Right    3. Perianal abscess  -     Ambulatory Referral to General Surgery    4. Pure hypercholesterolemia  -     Lipid Panel With / Chol / HDL Ratio; Future         SUMMARY/DISCUSSION  He is agreeable to try OTC lidocaine pain patches for chronic shoulder pain and work with PT.  If no improvement in pain symptoms, will refer to ortho, and consider MRI.  He is without any evidence for infection regarding perianal nodular swelling at today's visit.  He is currently followed by general surgery, and agreeable to meet with general surgery for possible I&D. He is educated to monitor for signs/ symptoms of infection and need for close follow up.  He will continue to work towards weight loss, limiting saturated food in diet.       Next Appointment with me: Visit date not found    Return in about 7 months (around 6/1/2024) for Annual physical.      VITAL SIGNS     Visit Vitals  /80   Pulse 68   Temp 97.6 °F (36.4 °C)   Ht 180.3 cm (70.98\")   Wt 126 kg (278 lb)   SpO2 97%   BMI 38.79 kg/m²             Wt Readings from Last 3 Encounters:   11/01/23 126 kg (278 lb)   07/28/23 119 kg (262 lb)   07/18/23 118 kg (261 lb)     Body mass index is 38.79 kg/m².        MEDICATIONS AT THE TIME OF OFFICE VISIT     Current Outpatient Medications on File Prior to Visit   Medication Sig Dispense Refill    acetaminophen (TYLENOL) 500 MG tablet Take 2-3 tablets by mouth Every 6 (Six) Hours As Needed for Mild Pain.      ibuprofen (ADVIL,MOTRIN) 200 MG tablet Take 2-3 tablets by mouth " Every 6 (Six) Hours As Needed for Mild Pain.       No current facility-administered medications on file prior to visit.        HISTORY OF PRESENT ILLNESS     Here to establish care.  Former patient of TETE Fuentes.    Plans for umbilical hernia repair with general surgery, Dr. Soto on 11/17/2023.  Underwent preop cardiac clearance, including Holter monitor, ECHO, treadmill stress test.  Holter monitor showed NSR with rare PVC.  ECHO with EF of 61-65%, normal left ventricular diastolic function.  Treadmill stress test showed no ECG evidence of myocardial ischemia; normal.  Mom with heart disease.      HLD: May 2023 Lipid panel with elevated ; elevated triglycerides 206. Not currently taking a statin.    May 2023 A1C 5.5; normal.      Diagnosed with idiopathic intracranial hypertension about 6 years ago.  He lost weight with resolution of symptoms.  He is followed regularly by eye clinic.      Several years ago, he fell into a desk while at work, resulting in right shoulder pain.  He has experienced chronic anterior and posterior right shoulder pain for years now, worse with reaching across his body and lifting his arm.  No numbness, tingling, weakness.      Expresses concern for possible hemorrhoid.  A couple of weeks ago, experienced some rectal sensitivity, and noticed a nodular swelling in rectal area.  No rectal bleeding or blood on toilet paper.  He used over the counter hemorrhoid cream with some improvement.   No bowel habit changes, abdominal pain, family history of colon cancer.   No fever, chills, or pain at today's visit.        Patient Care Team:  Estephania Walker APRN as PCP - General (Family Medicine)  Samson Harris MD as Consulting Physician (Cardiology)    REVIEW OF SYSTEMS     Review of Systems   Constitutional:  Negative for chills, fever and unexpected weight change.   Respiratory:  Negative for cough, chest tightness and shortness of breath.    Cardiovascular:  Negative for  chest pain, palpitations and leg swelling.   Musculoskeletal:  Positive for arthralgias (Right shoulder).   Neurological:  Negative for dizziness, weakness, light-headedness and headaches.   Psychiatric/Behavioral:  The patient is not nervous/anxious.           PHYSICAL EXAMINATION     Physical Exam  Vitals reviewed. Exam conducted with a chaperone present.   Constitutional:       General: He is not in acute distress.     Appearance: Normal appearance. He is not ill-appearing, toxic-appearing or diaphoretic.   HENT:      Head: Normocephalic and atraumatic.   Cardiovascular:      Rate and Rhythm: Normal rate and regular rhythm.      Heart sounds: Normal heart sounds.   Pulmonary:      Effort: Pulmonary effort is normal.      Breath sounds: Normal breath sounds.   Genitourinary:     Rectum: No external hemorrhoid or internal hemorrhoid.          Comments: Dime sized nodular swelling without erythema, warmth or tenderness  Musculoskeletal:      Right shoulder: No tenderness or bony tenderness. Normal range of motion. Normal strength. Normal pulse.   Neurological:      Mental Status: He is alert and oriented to person, place, and time. Mental status is at baseline.   Psychiatric:         Mood and Affect: Mood normal.         Behavior: Behavior normal.         Thought Content: Thought content normal.         Judgment: Judgment normal.           REVIEWED DATA     Labs:           Imaging:            Medical Tests:           Summary of old records / correspondence / consultant report:           Request outside records:

## 2023-11-02 ENCOUNTER — PATIENT ROUNDING (BHMG ONLY) (OUTPATIENT)
Dept: INTERNAL MEDICINE | Age: 38
End: 2023-11-02
Payer: COMMERCIAL

## 2023-11-02 NOTE — PROGRESS NOTES
A [employer] message has been sent to the patient for PATIENT ROUNDING with [facility].

## 2023-11-03 ENCOUNTER — PRE-ADMISSION TESTING (OUTPATIENT)
Dept: PREADMISSION TESTING | Facility: HOSPITAL | Age: 38
End: 2023-11-03
Payer: COMMERCIAL

## 2023-11-03 VITALS
HEIGHT: 70 IN | BODY MASS INDEX: 39.54 KG/M2 | WEIGHT: 276.2 LBS | TEMPERATURE: 98.1 F | OXYGEN SATURATION: 98 % | SYSTOLIC BLOOD PRESSURE: 127 MMHG | RESPIRATION RATE: 16 BRPM | HEART RATE: 78 BPM | DIASTOLIC BLOOD PRESSURE: 70 MMHG

## 2023-11-03 LAB
ANION GAP SERPL CALCULATED.3IONS-SCNC: 11 MMOL/L (ref 5–15)
BUN SERPL-MCNC: 15 MG/DL (ref 6–20)
BUN/CREAT SERPL: 16.5 (ref 7–25)
CALCIUM SPEC-SCNC: 9.3 MG/DL (ref 8.6–10.5)
CHLORIDE SERPL-SCNC: 105 MMOL/L (ref 98–107)
CO2 SERPL-SCNC: 27 MMOL/L (ref 22–29)
CREAT SERPL-MCNC: 0.91 MG/DL (ref 0.76–1.27)
DEPRECATED RDW RBC AUTO: 41.4 FL (ref 37–54)
EGFRCR SERPLBLD CKD-EPI 2021: 110.6 ML/MIN/1.73
ERYTHROCYTE [DISTWIDTH] IN BLOOD BY AUTOMATED COUNT: 12.8 % (ref 12.3–15.4)
GLUCOSE SERPL-MCNC: 94 MG/DL (ref 65–99)
HCT VFR BLD AUTO: 40.4 % (ref 37.5–51)
HGB BLD-MCNC: 13.6 G/DL (ref 13–17.7)
MCH RBC QN AUTO: 30.1 PG (ref 26.6–33)
MCHC RBC AUTO-ENTMCNC: 33.7 G/DL (ref 31.5–35.7)
MCV RBC AUTO: 89.4 FL (ref 79–97)
PLATELET # BLD AUTO: 202 10*3/MM3 (ref 140–450)
PMV BLD AUTO: 10.5 FL (ref 6–12)
POTASSIUM SERPL-SCNC: 4 MMOL/L (ref 3.5–5.2)
RBC # BLD AUTO: 4.52 10*6/MM3 (ref 4.14–5.8)
SODIUM SERPL-SCNC: 143 MMOL/L (ref 136–145)
WBC NRBC COR # BLD: 6.28 10*3/MM3 (ref 3.4–10.8)

## 2023-11-03 PROCEDURE — 85027 COMPLETE CBC AUTOMATED: CPT

## 2023-11-03 PROCEDURE — 80048 BASIC METABOLIC PNL TOTAL CA: CPT

## 2023-11-03 PROCEDURE — 36415 COLL VENOUS BLD VENIPUNCTURE: CPT

## 2023-11-03 NOTE — DISCHARGE INSTRUCTIONS
Take the following medications the morning of surgery: none    Arrival time: 730      General Instructions:  Do not eat solid food after midnight the night before surgery.  You may drink clear liquids day of surgery but must stop at least one hour before your hospital arrival time.  It is beneficial for you to have a clear drink that contains carbohydrates the day of surgery.  We suggest a 12 to 20 ounce bottle of Gatorade or Powerade for non-diabetic patients or a 12 to 20 ounce bottle of G2 or Powerade Zero for diabetic patients. (Pediatric patients, are not advised to drink a 12 to 20 ounce carbohydrate drink)    Clear liquids are liquids you can see through.  Nothing red in color.     Plain water                               Sports drinks  Sodas                                   Gelatin (Jell-O)  Fruit juices without pulp such as white grape juice and apple juice  Popsicles that contain no fruit or yogurt  Tea or coffee (no cream or milk added)  Gatorade / Powerade  G2 / Powerade Zero    Patients who avoid smoking, chewing tobacco and alcohol for 4 weeks prior to surgery have a reduced risk of post-operative complications.  Quit smoking as many days before surgery as you can.  Do not smoke, use chewing tobacco or drink alcohol the day of surgery.   Bring any papers given to you in the doctor’s office.  Wear clean comfortable clothes.  Do not wear contact lenses, false eyelashes or make-up.  Bring a case for your glasses.   Bring crutches or walker if applicable.  Remove all piercings.  Leave jewelry and any other valuables at home.  Hair extensions with metal clips must be removed prior to surgery.  The Pre-Admission Testing nurse will instruct you to bring medications if unable to obtain an accurate list in Pre-Admission Testing.      Preventing a Surgical Site Infection:  For 2 to 3 days before surgery, avoid shaving with a razor because the razor can irritate skin and make it easier to develop an infection.     Any areas of open skin can increase the risk of a post-operative wound infection by allowing bacteria to enter and travel throughout the body.  Notify your surgeon if you have any skin wounds / rashes even if it is not near the expected surgical site.  The area will need assessed to determine if surgery should be delayed until it is healed.  The night prior to surgery shower using a fresh bar of anti-bacterial soap (such as Dial) and clean washcloth.  Sleep in a clean bed with clean clothing.  Do not allow pets to sleep with you.  Shower on the morning of surgery using a fresh bar of anti-bacterial soap (such as Dial) and clean washcloth.  Dry with a clean towel and dress in clean clothing.  Ask your surgeon if you will be receiving antibiotics prior to surgery.  Make sure you, your family, and all healthcare providers clean their hands with soap and water or an alcohol based hand  before caring for you or your wound.    Day of surgery:  Your arrival time is approximately two hours before your scheduled surgery time.  Upon arrival, a Pre-op nurse and Anesthesiologist will review your health history, obtain vital signs, and answer questions you may have.  The only belongings needed at this time will be a list of your home medications and if applicable your C-PAP/BI-PAP machine.  A Pre-op nurse will start an IV and you may receive medication in preparation for surgery, including something to help you relax.     Please be aware that surgery does come with discomfort.  We want to make every effort to control your discomfort so please discuss any uncontrolled symptoms with your nurse.   Your doctor will most likely have prescribed pain medications.      If you are going home after surgery you will receive individualized written care instructions before being discharged.  A responsible adult must drive you to and from the hospital on the day of your surgery and stay with you for 24 hours.  Discharge  prescriptions can be filled by the hospital pharmacy during regular pharmacy hours.  If you are having surgery late in the day/evening your prescription may be e-prescribed to your pharmacy.  Please verify your pharmacy hours or chose a 24 hour pharmacy to avoid not having access to your prescription because your pharmacy has closed for the day.    If you are staying overnight following surgery, you will be transported to your hospital room following the recovery period.  [facility] has all private rooms.    If you have any questions please call Pre-Admission Testing at [phone].  Deductibles and co-payments are collected on the day of service. Please be prepared to pay the required co-pay, deductible or deposit on the day of service as defined by your plan.    Call your surgeon immediately if you experience any of the following symptoms:  Sore Throat  Shortness of Breath or difficulty breathing  Cough  Chills  Body soreness or muscle pain  Headache  Fever  New loss of taste or smell  Do not arrive for your surgery ill.  Your procedure will need to be rescheduled to another time.  You will need to call your physician before the day of surgery to avoid any unnecessary exposure to hospital staff as well as other patients.    CHLORHEXIDINE CLOTH INSTRUCTIONS  The morning of surgery follow these instructions using the Chlorhexidine cloths you've been given.  These steps reduce bacteria on the body.  Do not use the cloths near your eyes, ears mouth, genitalia or on open wounds.  Throw the cloths away after use but do not try to flush them down a toilet.      Open and remove one cloth at a time from the package.    Leave the cloth unfolded and begin the bathing.  Massage the skin with the cloths using gentle pressure to remove bacteria.  Do not scrub harshly.   Follow the steps below with one 2% CHG cloth per area (6 total cloths).  One cloth for neck, shoulders and chest.  One cloth for both arms,  hands, fingers and underarms (do underarms last).  One cloth for the abdomen followed by groin.  One cloth for right leg and foot including between the toes.  One cloth for left leg and foot including between the toes.  The last cloth is to be used for the back of the neck, back and buttocks.    Allow the CHG to air dry 3 minutes on the skin which will give it time to work and decrease the chance of irritation.  The skin may feel sticky until it is dry.  Do not rinse with water or any other liquid or you will lose the beneficial effects of the CHG.  If mild skin irritation occurs, do rinse the skin to remove the CHG.  Report this to the nurse at time of admission.  Do not apply lotions, creams, ointments, deodorants or perfumes after using the clothes. Dress in clean clothes before coming to the hospital.

## 2023-11-07 ENCOUNTER — TREATMENT (OUTPATIENT)
Dept: PHYSICAL THERAPY | Facility: CLINIC | Age: 38
End: 2023-11-07
Payer: COMMERCIAL

## 2023-11-07 DIAGNOSIS — G89.29 CHRONIC RIGHT SHOULDER PAIN: Primary | ICD-10-CM

## 2023-11-07 DIAGNOSIS — M25.511 CHRONIC RIGHT SHOULDER PAIN: Primary | ICD-10-CM

## 2023-11-07 PROCEDURE — 97112 NEUROMUSCULAR REEDUCATION: CPT | Performed by: PHYSICAL THERAPIST

## 2023-11-07 PROCEDURE — 97110 THERAPEUTIC EXERCISES: CPT | Performed by: PHYSICAL THERAPIST

## 2023-11-07 PROCEDURE — 97161 PT EVAL LOW COMPLEX 20 MIN: CPT | Performed by: PHYSICAL THERAPIST

## 2023-11-07 NOTE — PROGRESS NOTES
Physical Therapy Initial Evaluation and Plan of Care  [address], [address]  [place] [address]    Patient: Torey Coy   : 1985  Diagnosis/ICD-10 Code:  Chronic right shoulder pain [M25.511, G89.29]  Referring practitioner: TETE Rojo  Date of Initial Visit: 2023  Today's Date: 2023  Patient seen for 1 session         Visit Diagnoses:    ICD-10-CM ICD-9-CM   1. Chronic right shoulder pain  M25.511 719.41    G89.29 338.29         Subjective Questionnaire: QuickDASH: 13.64      Subjective Evaluation    History of Present Illness  Mechanism of injury: Patent reports that his right shoulder has bothered him for about 10 years.  States that he tripped and fell catching himself with the right UE.  States that the shoulder has hurt more and more.  Reports having constant pain in the right shoulder region.      Denies any previous surgeries.      Patient Occupation: Deskwork Pain  Current pain ratin  At worst pain ratin  Location: right anterior shoulder extending to the right UT  Quality: dull ache and sharp  Alleviating factors: keeping the arm still for a second.  Exacerbated by: shooting basketball, reaching across his body.  Progression: worsening    Hand dominance: right             Objective          Observations     Additional Shoulder Observation Details  Moderate protracted shoulders.    Palpation     Additional Palpation Details  TTP to the right LHB tendon, SS tendon, UT and levator.    Active Range of Motion     Right Shoulder   Flexion: Right shoulder active forward flexion: WNL.   Abduction: Right shoulder active abduction: about 150.   External rotation 0°: Right shoulder active external rotation at 0 degrees: WNL.   Internal rotation BTB: Active internal rotation behind the back: WNL.     Strength/Myotome Testing     Right Shoulder     Planes of Motion   Flexion: 4+   Abduction: 4+   External rotation at 0°: 4+   Internal rotation at 0°: 4+      Isolated Muscles   Supraspinatus: 4+     Tests   Cervical     Right   Negative active compression ([place]).     Right Shoulder   Positive Hawkin's.   Negative empty can.           Assessment & Plan       Assessment  Impairments: abnormal or restricted ROM, activity intolerance, lacks appropriate home exercise program and pain with function   Assessment details: Patient presents with c/o pain, TTP, limited shoulder abduction AROM and positive special testing which is limiting his ability to perform activities.   Barriers to therapy: none  Prognosis: good  Prognosis details: STG's to be met by 2 weeks  1)  Independent with HEP  2)  Decrease pain by 50% or more  3)  AROM WNL for the right shoulder without pain  4)  Min to no TTP present    LTG's to be met by 4 weeks  1)  Independent with HEP progression  2)  Decrease pain by 75% or more  3)  Negative special testing  4)  No TTP present to indicate improved healing  5)  Patient to return to activities without limitations        Plan  Therapy options: will be seen for skilled therapy services  Planned therapy interventions: strengthening, stretching, therapeutic activities, home exercise program and neuromuscular re-education  Frequency: 1x week  Duration in weeks: 4  Treatment plan discussed with: patient            Timed:         Manual Therapy:    0     mins  29153;     Therapeutic Exercise:    13     mins  47262;    Neuromuscular Estefani:    8    mins  93307;    Therapeutic Activity:     0     mins  99414;     Gait Trainin     mins  70899;     Ultrasound:     0     mins  01012;          Un-Timed:  Electrical Stimulation:    0     mins  80927 ( );    Low Eval     12     Mins  17329  Mod Eval     0     Mins  85947  High Eval                       0     Mins  75031        Timed Treatment:   21   mins   Total Treatment:     33   mins          PT: Renan Jacobs, PT     [place] [id number]  Electronically signed by Renan Jacobs, PT, 23,  2:51 PM EST    Certification Period: 11/7/2023 thru 2/4/2024  I certify that the therapy services are furnished while this patient is under my care.  The services outlined above are required by this patient, and will be reviewed every 90 days.    Estephania Walker Aprn  [address] [address]   NPI: [id number]      Renan Jacobs, PT   License number: [id number]        Physician Signature:__________________________________________________    PHYSICIAN: Estephania Walker APRN      DATE:     Please sign and return via fax to [email or web address] . Thank you, [facility].

## 2023-11-14 ENCOUNTER — TELEPHONE (OUTPATIENT)
Dept: PHYSICAL THERAPY | Facility: CLINIC | Age: 38
End: 2023-11-14

## 2023-11-17 ENCOUNTER — ANESTHESIA EVENT (OUTPATIENT)
Dept: PERIOP | Facility: HOSPITAL | Age: 38
End: 2023-11-17
Payer: COMMERCIAL

## 2023-11-17 ENCOUNTER — ANESTHESIA (OUTPATIENT)
Dept: PERIOP | Facility: HOSPITAL | Age: 38
End: 2023-11-17
Payer: COMMERCIAL

## 2023-11-17 ENCOUNTER — HOSPITAL ENCOUNTER (OUTPATIENT)
Facility: HOSPITAL | Age: 38
Setting detail: HOSPITAL OUTPATIENT SURGERY
Discharge: HOME | End: 2023-11-17
Attending: SURGERY | Admitting: SURGERY
Payer: COMMERCIAL

## 2023-11-17 VITALS
TEMPERATURE: 97.6 F | DIASTOLIC BLOOD PRESSURE: 70 MMHG | OXYGEN SATURATION: 95 % | SYSTOLIC BLOOD PRESSURE: 106 MMHG | RESPIRATION RATE: 18 BRPM | HEART RATE: 59 BPM

## 2023-11-17 DIAGNOSIS — K42.9 UMBILICAL HERNIA WITHOUT OBSTRUCTION AND WITHOUT GANGRENE: Primary | ICD-10-CM

## 2023-11-17 PROCEDURE — 25010000002 HYDROMORPHONE PER 4 MG: Performed by: NURSE ANESTHETIST, CERTIFIED REGISTERED

## 2023-11-17 PROCEDURE — 25010000002 ONDANSETRON PER 1 MG: Performed by: NURSE ANESTHETIST, CERTIFIED REGISTERED

## 2023-11-17 PROCEDURE — 25010000002 FENTANYL CITRATE (PF) 50 MCG/ML SOLUTION: Performed by: NURSE ANESTHETIST, CERTIFIED REGISTERED

## 2023-11-17 PROCEDURE — 25010000002 PROPOFOL 200 MG/20ML EMULSION: Performed by: NURSE ANESTHETIST, CERTIFIED REGISTERED

## 2023-11-17 PROCEDURE — 25010000002 DEXAMETHASONE SODIUM PHOSPHATE 20 MG/5ML SOLUTION: Performed by: NURSE ANESTHETIST, CERTIFIED REGISTERED

## 2023-11-17 PROCEDURE — 49591 RPR AA HRN 1ST < 3 CM RDC: CPT | Performed by: SURGERY

## 2023-11-17 PROCEDURE — 25810000003 LACTATED RINGERS PER 1000 ML: Performed by: ANESTHESIOLOGY

## 2023-11-17 PROCEDURE — 25010000002 LIDOCAINE 1 % SOLUTION: Performed by: ANESTHESIOLOGY

## 2023-11-17 PROCEDURE — C1781 MESH (IMPLANTABLE): HCPCS | Performed by: SURGERY

## 2023-11-17 PROCEDURE — 25010000002 CEFAZOLIN IN DEXTROSE 2-4 GM/100ML-% SOLUTION: Performed by: SURGERY

## 2023-11-17 PROCEDURE — 49591 RPR AA HRN 1ST < 3 CM RDC: CPT | Performed by: SPECIALIST/TECHNOLOGIST, OTHER

## 2023-11-17 DEVICE — BARD MESH PERFIX PLUG, SMALL
Type: IMPLANTABLE DEVICE | Site: UMBILICAL | Status: FUNCTIONAL
Brand: BARD MESH PERFIX PLUG

## 2023-11-17 RX ORDER — SODIUM CHLORIDE 0.9 % (FLUSH) 0.9 %
3 SYRINGE (ML) INJECTION EVERY 12 HOURS SCHEDULED
Status: DISCONTINUED | OUTPATIENT
Start: 2023-11-17 | End: 2023-11-17 | Stop reason: HOSPADM

## 2023-11-17 RX ORDER — NALOXONE HCL 0.4 MG/ML
0.2 VIAL (ML) INJECTION AS NEEDED
Status: DISCONTINUED | OUTPATIENT
Start: 2023-11-17 | End: 2023-11-17 | Stop reason: HOSPADM

## 2023-11-17 RX ORDER — PROPOFOL 10 MG/ML
INJECTION, EMULSION INTRAVENOUS AS NEEDED
Status: DISCONTINUED | OUTPATIENT
Start: 2023-11-17 | End: 2023-11-17 | Stop reason: SURG

## 2023-11-17 RX ORDER — SODIUM CHLORIDE 0.9 % (FLUSH) 0.9 %
3-10 SYRINGE (ML) INJECTION AS NEEDED
Status: DISCONTINUED | OUTPATIENT
Start: 2023-11-17 | End: 2023-11-17 | Stop reason: HOSPADM

## 2023-11-17 RX ORDER — FENTANYL CITRATE 50 UG/ML
INJECTION, SOLUTION INTRAMUSCULAR; INTRAVENOUS AS NEEDED
Status: DISCONTINUED | OUTPATIENT
Start: 2023-11-17 | End: 2023-11-17 | Stop reason: SURG

## 2023-11-17 RX ORDER — HYDROCODONE BITARTRATE AND ACETAMINOPHEN 5; 325 MG/1; MG/1
1 TABLET ORAL EVERY 4 HOURS PRN
Qty: 10 TABLET | Refills: 0 | Status: SHIPPED | OUTPATIENT
Start: 2023-11-17

## 2023-11-17 RX ORDER — HYDRALAZINE HYDROCHLORIDE 20 MG/ML
5 INJECTION INTRAMUSCULAR; INTRAVENOUS
Status: DISCONTINUED | OUTPATIENT
Start: 2023-11-17 | End: 2023-11-17 | Stop reason: HOSPADM

## 2023-11-17 RX ORDER — ONDANSETRON 4 MG/1
4 TABLET, FILM COATED ORAL EVERY 6 HOURS PRN
Qty: 10 TABLET | Refills: 0 | Status: SHIPPED | OUTPATIENT
Start: 2023-11-17

## 2023-11-17 RX ORDER — MAGNESIUM HYDROXIDE 1200 MG/15ML
LIQUID ORAL AS NEEDED
Status: DISCONTINUED | OUTPATIENT
Start: 2023-11-17 | End: 2023-11-17 | Stop reason: HOSPADM

## 2023-11-17 RX ORDER — DROPERIDOL 2.5 MG/ML
0.62 INJECTION, SOLUTION INTRAMUSCULAR; INTRAVENOUS
Status: DISCONTINUED | OUTPATIENT
Start: 2023-11-17 | End: 2023-11-17 | Stop reason: HOSPADM

## 2023-11-17 RX ORDER — LIDOCAINE HYDROCHLORIDE 20 MG/ML
INJECTION, SOLUTION EPIDURAL; INFILTRATION; INTRACAUDAL; PERINEURAL AS NEEDED
Status: DISCONTINUED | OUTPATIENT
Start: 2023-11-17 | End: 2023-11-17 | Stop reason: SURG

## 2023-11-17 RX ORDER — HYDROCODONE BITARTRATE AND ACETAMINOPHEN 5; 325 MG/1; MG/1
1 TABLET ORAL ONCE AS NEEDED
Status: COMPLETED | OUTPATIENT
Start: 2023-11-17 | End: 2023-11-17

## 2023-11-17 RX ORDER — MIDAZOLAM HYDROCHLORIDE 1 MG/ML
1 INJECTION INTRAMUSCULAR; INTRAVENOUS
Status: DISCONTINUED | OUTPATIENT
Start: 2023-11-17 | End: 2023-11-17 | Stop reason: HOSPADM

## 2023-11-17 RX ORDER — HYDROMORPHONE HYDROCHLORIDE 1 MG/ML
0.5 INJECTION, SOLUTION INTRAMUSCULAR; INTRAVENOUS; SUBCUTANEOUS
Status: DISCONTINUED | OUTPATIENT
Start: 2023-11-17 | End: 2023-11-17 | Stop reason: HOSPADM

## 2023-11-17 RX ORDER — FLUMAZENIL 0.1 MG/ML
0.2 INJECTION INTRAVENOUS AS NEEDED
Status: DISCONTINUED | OUTPATIENT
Start: 2023-11-17 | End: 2023-11-17 | Stop reason: HOSPADM

## 2023-11-17 RX ORDER — BUPIVACAINE HYDROCHLORIDE AND EPINEPHRINE 5; 5 MG/ML; UG/ML
INJECTION, SOLUTION EPIDURAL; INTRACAUDAL; PERINEURAL AS NEEDED
Status: DISCONTINUED | OUTPATIENT
Start: 2023-11-17 | End: 2023-11-17 | Stop reason: HOSPADM

## 2023-11-17 RX ORDER — FENTANYL CITRATE 50 UG/ML
50 INJECTION, SOLUTION INTRAMUSCULAR; INTRAVENOUS
Status: DISCONTINUED | OUTPATIENT
Start: 2023-11-17 | End: 2023-11-17 | Stop reason: HOSPADM

## 2023-11-17 RX ORDER — SODIUM CHLORIDE, SODIUM LACTATE, POTASSIUM CHLORIDE, CALCIUM CHLORIDE 600; 310; 30; 20 MG/100ML; MG/100ML; MG/100ML; MG/100ML
9 INJECTION, SOLUTION INTRAVENOUS CONTINUOUS
Status: DISCONTINUED | OUTPATIENT
Start: 2023-11-17 | End: 2023-11-17 | Stop reason: HOSPADM

## 2023-11-17 RX ORDER — DEXAMETHASONE SODIUM PHOSPHATE 4 MG/ML
INJECTION, SOLUTION INTRA-ARTICULAR; INTRALESIONAL; INTRAMUSCULAR; INTRAVENOUS; SOFT TISSUE AS NEEDED
Status: DISCONTINUED | OUTPATIENT
Start: 2023-11-17 | End: 2023-11-17 | Stop reason: SURG

## 2023-11-17 RX ORDER — LABETALOL HYDROCHLORIDE 5 MG/ML
5 INJECTION, SOLUTION INTRAVENOUS
Status: DISCONTINUED | OUTPATIENT
Start: 2023-11-17 | End: 2023-11-17 | Stop reason: HOSPADM

## 2023-11-17 RX ORDER — PROMETHAZINE HYDROCHLORIDE 25 MG/1
25 TABLET ORAL ONCE AS NEEDED
Status: DISCONTINUED | OUTPATIENT
Start: 2023-11-17 | End: 2023-11-17 | Stop reason: HOSPADM

## 2023-11-17 RX ORDER — EPHEDRINE SULFATE 50 MG/ML
5 INJECTION, SOLUTION INTRAVENOUS ONCE AS NEEDED
Status: DISCONTINUED | OUTPATIENT
Start: 2023-11-17 | End: 2023-11-17 | Stop reason: HOSPADM

## 2023-11-17 RX ORDER — DIPHENHYDRAMINE HYDROCHLORIDE 50 MG/ML
12.5 INJECTION INTRAMUSCULAR; INTRAVENOUS
Status: DISCONTINUED | OUTPATIENT
Start: 2023-11-17 | End: 2023-11-17 | Stop reason: HOSPADM

## 2023-11-17 RX ORDER — FAMOTIDINE 10 MG/ML
20 INJECTION, SOLUTION INTRAVENOUS ONCE
Status: COMPLETED | OUTPATIENT
Start: 2023-11-17 | End: 2023-11-17

## 2023-11-17 RX ORDER — OXYCODONE AND ACETAMINOPHEN 7.5; 325 MG/1; MG/1
1 TABLET ORAL EVERY 4 HOURS PRN
Status: DISCONTINUED | OUTPATIENT
Start: 2023-11-17 | End: 2023-11-17 | Stop reason: HOSPADM

## 2023-11-17 RX ORDER — FENTANYL CITRATE 50 UG/ML
50 INJECTION, SOLUTION INTRAMUSCULAR; INTRAVENOUS ONCE AS NEEDED
Status: DISCONTINUED | OUTPATIENT
Start: 2023-11-17 | End: 2023-11-17 | Stop reason: HOSPADM

## 2023-11-17 RX ORDER — LIDOCAINE HYDROCHLORIDE 10 MG/ML
0.5 INJECTION, SOLUTION INFILTRATION; PERINEURAL ONCE AS NEEDED
Status: COMPLETED | OUTPATIENT
Start: 2023-11-17 | End: 2023-11-17

## 2023-11-17 RX ORDER — PROMETHAZINE HYDROCHLORIDE 25 MG/1
25 SUPPOSITORY RECTAL ONCE AS NEEDED
Status: DISCONTINUED | OUTPATIENT
Start: 2023-11-17 | End: 2023-11-17 | Stop reason: HOSPADM

## 2023-11-17 RX ORDER — ONDANSETRON 2 MG/ML
4 INJECTION INTRAMUSCULAR; INTRAVENOUS ONCE AS NEEDED
Status: DISCONTINUED | OUTPATIENT
Start: 2023-11-17 | End: 2023-11-17 | Stop reason: HOSPADM

## 2023-11-17 RX ORDER — CEFAZOLIN SODIUM 2 G/100ML
2000 INJECTION, SOLUTION INTRAVENOUS ONCE
Status: COMPLETED | OUTPATIENT
Start: 2023-11-17 | End: 2023-11-17

## 2023-11-17 RX ORDER — ONDANSETRON 2 MG/ML
INJECTION INTRAMUSCULAR; INTRAVENOUS AS NEEDED
Status: DISCONTINUED | OUTPATIENT
Start: 2023-11-17 | End: 2023-11-17 | Stop reason: SURG

## 2023-11-17 RX ORDER — IPRATROPIUM BROMIDE AND ALBUTEROL SULFATE 2.5; .5 MG/3ML; MG/3ML
3 SOLUTION RESPIRATORY (INHALATION) ONCE AS NEEDED
Status: DISCONTINUED | OUTPATIENT
Start: 2023-11-17 | End: 2023-11-17 | Stop reason: HOSPADM

## 2023-11-17 RX ADMIN — HYDROCODONE BITARTRATE AND ACETAMINOPHEN 1 TABLET: 5; 325 TABLET ORAL at 12:50

## 2023-11-17 RX ADMIN — ONDANSETRON 4 MG: 2 INJECTION INTRAMUSCULAR; INTRAVENOUS at 11:53

## 2023-11-17 RX ADMIN — LIDOCAINE HYDROCHLORIDE 60 MG: 20 INJECTION, SOLUTION EPIDURAL; INFILTRATION; INTRACAUDAL; PERINEURAL at 11:48

## 2023-11-17 RX ADMIN — SODIUM CHLORIDE, POTASSIUM CHLORIDE, SODIUM LACTATE AND CALCIUM CHLORIDE 9 ML/HR: 600; 310; 30; 20 INJECTION, SOLUTION INTRAVENOUS at 08:53

## 2023-11-17 RX ADMIN — FAMOTIDINE 20 MG: 10 INJECTION INTRAVENOUS at 09:48

## 2023-11-17 RX ADMIN — LIDOCAINE HYDROCHLORIDE 0.5 ML: 10 INJECTION, SOLUTION INFILTRATION; PERINEURAL at 08:53

## 2023-11-17 RX ADMIN — HYDROMORPHONE HYDROCHLORIDE 0.5 MG: 1 INJECTION, SOLUTION INTRAMUSCULAR; INTRAVENOUS; SUBCUTANEOUS at 13:03

## 2023-11-17 RX ADMIN — PROPOFOL 200 MG: 10 INJECTION, EMULSION INTRAVENOUS at 11:48

## 2023-11-17 RX ADMIN — CEFAZOLIN SODIUM 2000 MG: 2 INJECTION, SOLUTION INTRAVENOUS at 11:40

## 2023-11-17 RX ADMIN — FENTANYL CITRATE 50 MCG: 50 INJECTION, SOLUTION INTRAMUSCULAR; INTRAVENOUS at 11:55

## 2023-11-17 RX ADMIN — DEXAMETHASONE SODIUM PHOSPHATE 8 MG: 4 INJECTION, SOLUTION INTRAMUSCULAR; INTRAVENOUS at 11:53

## 2023-11-17 RX ADMIN — FENTANYL CITRATE 50 MCG: 50 INJECTION, SOLUTION INTRAMUSCULAR; INTRAVENOUS at 12:34

## 2023-11-17 NOTE — ANESTHESIA PROCEDURE NOTES
Airway  Urgency: elective    Date/Time: 11/17/2023 11:49 AM  Airway not difficult    General Information and Staff    Patient location during procedure: OR  CRNA/CAA: Yumiko Romano CRNA    Indications and Patient Condition  Indications for airway management: airway protection    Preoxygenated: yes  Mask difficulty assessment: 1 - vent by mask    Final Airway Details  Final airway type: supraglottic airway      Successful airway: classic  Size 5     Number of attempts at approach: 1  Assessment: lips, teeth, and gum same as pre-op    Additional Comments  Smooth IV induction. LMA inserted with ease. Cuff up. Lma secured. BEBS

## 2023-11-17 NOTE — H&P
CC: Umbilical hernia    HPI: 38-year-old gentleman with umbilical hernia that is intermittently symptomatic    PMH, PSH, MEDS AND ALLERGIES reviewed and reconciled in  EPIC    PHYSICAL EXAM:  Constitutional:  awake, alert, no acute distress  VS: afebrile, VSS  Respiratory:  normal inspiratory effort  Cardiovascular: regular rate  Gastrointestinal: Soft, reducible umbilical hernia    ROS:  relevant systems negative other than any presenting complaints    ASSESSMENT:    38-year-old gentleman with symptomatic umbilical hernia    PLAN:  Open umbilical hernia repair, he understands nature the procedure and the risks including but not limited to bleeding, infection, use of mesh, and recurrence    Trenton Soto M.D.

## 2023-11-17 NOTE — ANESTHESIA POSTPROCEDURE EVALUATION
Patient: Torey Coy    Procedure Summary       Date: 11/17/23 Room / Location:  [facility]    Anesthesia Start: 1143 Anesthesia Stop: 1228    Procedure: UMBILICAL HERNIA REPAIR WITH MESH (Abdomen) Diagnosis:       Umbilical hernia without obstruction and without gangrene      (Umbilical hernia without obstruction and without gangrene [K42.9])    Surgeons: Trenton Soto MD Provider: Rajinder Olvera MD    Anesthesia Type: general ASA Status: 3            Anesthesia Type: general    Vitals  Vitals Value Taken Time   /58 11/17/23 1345   Temp 36.4 °C (97.6 °F) 11/17/23 1226   Pulse 63 11/17/23 1402   Resp 18 11/17/23 1330   SpO2 90 % 11/17/23 1402   Vitals shown include unfiled device data.        Post Anesthesia Care and Evaluation    Pain management: adequate    Airway patency: patent  Anesthetic complications: No anesthetic complications    Cardiovascular status: acceptable  Respiratory status: acceptable  Hydration status: acceptable    Comments: /58   Pulse 61   Temp 36.4 °C (97.6 °F) (Oral)   Resp 18   SpO2 93%

## 2023-11-17 NOTE — DISCHARGE INSTRUCTIONS
Dr. Trenton Soto  [address]  [place] [id number] [phone]    Discharge Instructions for Hernia Surgery    Go home, rest and take it easy today; however, you should get up and move about several times today to reduce the risk of developing a clot in your legs.      You may experience some dizziness or memory loss from the anesthesia.  This may last for the next 24 hours.  Someone should plan on staying with you for the first 24 hours for your safety.    Do not make any important legal decisions or sign any legal papers for the next 24 hours.      Eat and drink lightly today.  Start off with liquids, jello, soup, crackers or other bland foods at first. You may advance your diet tomorrow as tolerated as long as you do not experience any nausea or vomiting.     If skin glue (Dermabond) was used, your incisions are protected and covered.  The invisible glue will dissolve on its own as your incision heals. If dressings were used, you may remove your outer dressings in 3 days.  The white tapes called steri-strips should stay in place.  They will fall off on their own in 1-2 weeks.  Do not worry if they come off sooner.      If dressings were used, you may notice some bleeding/drainage on your outer dressings. A little bloody drainage is normal. If the bleeding/drainage is such that the bandage cannot absorb it, remove the dressing, apply clean gauze and apply firm pressure for a full 15 minutes.  If the bleeding continues, please call me.    You may shower tomorrow allowing water to run over the incisions; however, do not scrub the incisions.  No tub baths until your incisions are completely healed.      No lifting > 20 lbs. until you are seen at your follow-up visit.         You have received a prescription for a narcotic pain medicine, as you will have some pain following surgery.   You will not be totally pain free, but your pain medicine should make the pain tolerable.  Please take your pain  medicine as prescribed and always take your pills with food to prevent nausea. If you are having severe pain that cannot be controlled by the pain medicine, please contact me.      You have also received a prescription for an anti-nausea medicine.  Please take this as prescribed for any nausea or vomiting.  Nausea could be a result of the anesthesia or a result of the narcotic pain medicine.  If you experience severe nausea and vomiting that cannot be controlled by the nausea medicine, please call me.      If you had a laparoscopic surgery, it is not unusual to experience pain/discomfort in your shoulders or under your ribs after surgery.  It is from the gas used during the laparoscopic procedure and usually lasts 1-3 days.  The prescription pain medicine is used to treat the surgical pain and does not typically alleviate this “gassy” pain.     No driving for 24 hours and for as long as you are taking your prescription pain medicine.    You will need to call the office at [phone] to schedule a follow-up appointment in 6-10 days.     Remember to contact me for any of the following:    Fever > 101 degrees  Severe pain that cannot be controlled by taking your pain pills  Severe nausea or vomiting that cannot be controlled by taking your nausea pills  Significant bleeding of your incisions  Drainage that has a bad smell or is yellow or green in appearance  Any other questions or concerns      Additional Instruction for Inguinal Hernia Patients Only    If you did not urinate at the hospital after your surgery or if you feel the need to urinate and cannot, this will necessitate a return to the Emergency Room for placement of a urinary catheter.  You should also notify me as well.  As a rule, you should be able to empty your bladder within 4-6 hours after discharge from the hospital.      You may notice some scrotal bruising and/or swelling. A scrotal support or briefs as well as ice packs may be used to alleviate  discomfort.

## 2023-11-17 NOTE — OP NOTE
PREOPERATIVE DIAGNOSIS:  Umbilical hernia    POSTOPERATIVE DIAGNOSIS (FINDINGS):  Umbilical hernia with 1.5 cm fascial defect    PROCEDURE:  Open umbilical hernia repair with small mesh plug, 1.5 cm fascial defect    SURGEON:  Trenton Soto MD    ASSISTANT:  Mayi Marie was responsible for performing the following activities: suction, irrigation, suturing, closing, retraction, and placing dressing, and their skilled assistance was necessary for the success of this case.    ANESTHESIA:  General    EBL:  Minimal    SPECIMEN(S):  none    DESCRIPTION:  In supine position under general anesthetic prepped and draped usual sterile manner.  Half percent Marcaine with epinephrine infiltrated locally.  Curvilinear incision made below the umbilicus and carried down to the fascia.  The hernia sac was  from the umbilical skin using the electrocautery and then dissected to the level of the fascia.  The hernia contents which consisted of herniated preperitoneal fat was reduced to the preperitoneal plane.  Fascial defect measured 1.5 cm and a small mesh plug was inserted into the preperitoneal plane.  The fascia was closed over the plug with interrupted 0 Ethibond sutures incorporating bites of the plug in the closure.  Good hemostasis was ensured.  Umbilicus was tacked to the fascia with 3-0 Vicryl.  Skin was closed with 3-0 Vicryl deep dermal and 5-0 Vicryl subcuticular followed by Exofin.  Tolerated well, stable to recovery room.    Trenton Soto M.D.

## 2023-11-17 NOTE — ANESTHESIA PREPROCEDURE EVALUATION
Anesthesia Evaluation     NPO Solid Status: > 8 hours             Airway   Mallampati: III  Dental      Pulmonary    (-) sleep apnea, not a smoker    ROS comment: Negative patient screen for ZAK    Cardiovascular         Neuro/Psych  (+) headaches  GI/Hepatic/Renal/Endo    (+) morbid obesity    Musculoskeletal     Abdominal    Substance History      OB/GYN          Other                    Anesthesia Plan    ASA 3     general       Anesthetic plan, risks, benefits, and alternatives have been provided, discussed and informed consent has been obtained with: patient.    CODE STATUS:          Name band;

## 2023-11-30 ENCOUNTER — TELEPHONE (OUTPATIENT)
Dept: PHYSICAL THERAPY | Facility: OTHER | Age: 38
End: 2023-11-30
Payer: COMMERCIAL

## 2023-11-30 NOTE — TELEPHONE ENCOUNTER
Caller: Torey Coy    Relationship: Self    What was the call regarding: PATIENT CANCELLED APPT TODAY BECAUSE THEY CANT MAKE IT

## 2023-12-07 ENCOUNTER — OFFICE VISIT (OUTPATIENT)
Dept: SURGERY | Facility: CLINIC | Age: 38
End: 2023-12-07
Payer: COMMERCIAL

## 2023-12-07 VITALS
HEIGHT: 70 IN | BODY MASS INDEX: 41.78 KG/M2 | DIASTOLIC BLOOD PRESSURE: 70 MMHG | WEIGHT: 291.8 LBS | SYSTOLIC BLOOD PRESSURE: 116 MMHG

## 2023-12-07 DIAGNOSIS — Z48.89 POSTOPERATIVE VISIT: Primary | ICD-10-CM

## 2023-12-07 DIAGNOSIS — K42.9 UMBILICAL HERNIA WITHOUT OBSTRUCTION AND WITHOUT GANGRENE: ICD-10-CM

## 2023-12-07 NOTE — PROGRESS NOTES
Chief complaint: Postoperative visit    Open umbilical hernia repair with small mesh plug 11/27/2023    Office visit: Incision has healed well.  Abdomen soft.  Activity restrictions discussed.  Return as needed.

## 2023-12-14 ENCOUNTER — DOCUMENTATION (OUTPATIENT)
Dept: PHYSICAL THERAPY | Facility: CLINIC | Age: 38
End: 2023-12-14
Payer: COMMERCIAL

## 2024-06-05 ENCOUNTER — OFFICE VISIT (OUTPATIENT)
Dept: INTERNAL MEDICINE | Age: 39
End: 2024-06-05
Payer: COMMERCIAL

## 2024-06-05 VITALS
HEART RATE: 90 BPM | OXYGEN SATURATION: 96 % | RESPIRATION RATE: 18 BRPM | HEIGHT: 70 IN | BODY MASS INDEX: 45.1 KG/M2 | DIASTOLIC BLOOD PRESSURE: 80 MMHG | WEIGHT: 315 LBS | TEMPERATURE: 97.5 F | SYSTOLIC BLOOD PRESSURE: 120 MMHG

## 2024-06-05 DIAGNOSIS — E66.01 CLASS 3 SEVERE OBESITY DUE TO EXCESS CALORIES WITH SERIOUS COMORBIDITY AND BODY MASS INDEX (BMI) OF 45.0 TO 49.9 IN ADULT: ICD-10-CM

## 2024-06-05 DIAGNOSIS — Z11.59 ENCOUNTER FOR HEPATITIS C SCREENING TEST FOR LOW RISK PATIENT: ICD-10-CM

## 2024-06-05 DIAGNOSIS — E78.2 MIXED HYPERLIPIDEMIA: ICD-10-CM

## 2024-06-05 DIAGNOSIS — Z00.00 ANNUAL PHYSICAL EXAM: Primary | ICD-10-CM

## 2024-06-05 PROCEDURE — 99395 PREV VISIT EST AGE 18-39: CPT

## 2024-06-05 NOTE — PROGRESS NOTES
"    I N T E R N A L  M E D I C I N E  Estephania Walker, APRN      ENCOUNTER DATE:  06/05/2024    Torey Jaimesroe / 39 y.o. / male    CHIEF COMPLAINT     Annual Exam    Underwent umbilical hernia repair with Dr. Soto in November 2023 and recovered well.    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  Next appointment is August 1, 2024.  July 2023 Holter monitor showed NSR with rare PVC.  July 2023 ECHO with EF of 61-65%, normal left ventricular diastolic function.  Treadmill stress test showed no ECG evidence of myocardial ischemia; normal.  Mom with heart disease.  No chest pain, dyspnea.       HLD: May 2023 Lipid panel with elevated ; elevated triglycerides 206. Not currently taking a statin.     May 2023 A1C 5.5; normal.      BMI 47: Up 58 pounds since November 2023.  Recently refocused on meal planning, and started walking and rejoined gym with plans to start attending in near future.       Diagnosed with idiopathic intracranial hypertension about 6 years ago.  He lost weight with resolution of symptoms.  He is followed yearly by eye clinic; up to date with vision exam.       At November 2023 appointment, he discuss remote history of right shoulder trauma resulting to chronic right shoulder pain. November 2023 Right Shoulder XR showed no fracture.  He completed 1 session of PT with benefit.  Denies any ongoing right shoulder pain at this time.       Also at prior November 2023 appointment, he reported a new nodular swelling in rectal area.  Thought to be possibly perirectal cyst (no evidence for infection).  He reports this has resolved on its own, and he did not follow up with general surgery.  Declines re exam at today's visit.  Not having any symptoms in rectal area.          VITALS     Visit Vitals  /80   Pulse 90   Temp 97.5 °F (36.4 °C)   Resp 18   Ht 177.8 cm (70\")   Wt (!) 152 kg (334 lb)   SpO2 96%   BMI 47.92 kg/m²       BP Readings from Last 3 Encounters: "   24 120/80   23 116/70   23 106/70     Wt Readings from Last 3 Encounters:   24 (!) 152 kg (334 lb)   23 132 kg (291 lb 12.8 oz)   23 125 kg (276 lb 3.2 oz)      Body mass index is 47.92 kg/m².      MEDICATIONS     Current Outpatient Medications on File Prior to Visit   Medication Sig Dispense Refill    [DISCONTINUED] acetaminophen (TYLENOL) 500 MG tablet Take 2-3 tablets by mouth Every 6 (Six) Hours As Needed for Mild Pain. (Patient not taking: Reported on 2023)      [DISCONTINUED] ibuprofen (ADVIL,MOTRIN) 200 MG tablet Take 2-3 tablets by mouth Every 6 (Six) Hours As Needed for Mild Pain. Hold for surgery pre op (Patient not taking: Reported on 2023)       No current facility-administered medications on file prior to visit.         HISTORY OF PRESENT ILLNESS      Torey presents for annual health maintenance visit.    General health: fair  Lifestyle:  Attempting to lose weight?: Yes   Diet: eats a well balanced, healthy diet  Exercise: exercises 1-2 days weekly  Tobacco: Never used   Alcohol: occasional/infrequent  Work: Full-time  Reproductive health:  Sexually active?: Yes   Concern for STD?: No   Sexual problems?: No problems   Sees Urologist?: No   Depression Screenin/5/2024     1:43 PM   PHQ-2/PHQ-9 Depression Screening   Little Interest or Pleasure in Doing Things 0-->not at all   Feeling Down, Depressed or Hopeless 0-->not at all   PHQ-9: Brief Depression Severity Measure Score 0         PHQ-2: 0 (Not depressed)     PHQ-9: 0 (Negative screening for depression)    Patient Care Team:  Estephania Walker APRN as PCP - General (Family Medicine)  Samson Harris MD as Consulting Physician (Cardiology)  ______________________________________________________________________    ALLERGIES  No Known Allergies     PFSH:     The following portions of the patient's history were reviewed and updated as appropriate: Allergies / Current Medications / Past  Medical History / Surgical History / Social History / Family History    PROBLEM LIST   Patient Active Problem List   Diagnosis    Pseudotumor cerebri syndrome    Double vision    Umbilical pain    Umbilical hernia without obstruction and without gangrene    Optic nerve disorder    Preop cardiovascular exam    Abnormal EKG    Pre-excitation syndrome       PAST MEDICAL HISTORY  Past Medical History:   Diagnosis Date    Asthma     history as a child    Headache Since i was a kid    They fo not happen as often now that i have been eating better    HL (hearing loss) Since i was a kid    Left ear has had multiple surgeries    Kidney stone     Migraine     treats with tylenol or advil and goes to sleep    Pre-excitation syndrome     followed by Dr. Harris after ABN EKG 6/30/2023    Umbilical hernia 11/03/2023       SURGICAL HISTORY  Past Surgical History:   Procedure Laterality Date    EXTRACORPOREAL SHOCKWAVE LITHOTRIPSY (ESWL), STENT INSERTION/REMOVAL Left 11/22/2019    Procedure: EXTRACORPOREAL SHOCKWAVE LITHOTRIPSY WITH CYSTOSCOPY, AND  LEFT STENT INSERTION;  Surgeon: Joseph Ramos MD;  Location: [facility];  Service: Urology    TUMOR REMOVAL      LT ear as a child benign    UMBILICAL HERNIA REPAIR N/A 11/17/2023    Procedure: UMBILICAL HERNIA REPAIR WITH MESH;  Surgeon: Trenton Soto MD;  Location: [facility] OR [facility];  Service: General;  Laterality: N/A;       SOCIAL HISTORY  Social History     Socioeconomic History    Marital status:    Tobacco Use    Smoking status: Never    Smokeless tobacco: Never   Vaping Use    Vaping status: Never Used   Substance and Sexual Activity    Alcohol use: Yes     Comment: I drink maybe twice a year and usually social drinking    Drug use: Never    Sexual activity: Yes     Partners: Female       FAMILY HISTORY  Family History   Problem Relation Age of Onset    Heart disease Mother     Heart failure Mother     Diabetes type II Mother     Hearing loss Father          The [place] family has always been hard to hear my whole life    Hypertension Father     Congenital heart disease Sister     Thyroid cancer Sister     Heart disease Paternal Grandfather         Had multiple bypasess and passed away from heart attack    Heart attack Paternal Grandfather     Malig Hyperthermia Neg Hx        IMMUNIZATION HISTORY  Immunization History   Administered Date(s) Administered    COVID-19 (PFIZER) Purple Cap Monovalent 08/06/2021, 08/28/2021    Covid-19 (Pfizer) Gray Cap Monovalent 03/26/2022    Influenza, Unspecified 01/13/2021    Tdap 03/08/2011, 10/29/2022         REVIEW OF SYSTEMS     Review of Systems   Constitutional:  Negative for chills, fever and unexpected weight change.   Respiratory:  Negative for cough, chest tightness and shortness of breath.    Cardiovascular:  Negative for chest pain, palpitations and leg swelling.   Neurological:  Negative for dizziness, weakness, light-headedness and headaches.   Psychiatric/Behavioral:  The patient is not nervous/anxious.        PHYSICAL EXAMINATION     Physical Exam  Vitals reviewed.   Constitutional:       General: He is not in acute distress.     Appearance: Normal appearance. He is not ill-appearing, toxic-appearing or diaphoretic.   HENT:      Head: Normocephalic and atraumatic.      Right Ear: Tympanic membrane, ear canal and external ear normal. There is no impacted cerumen.      Left Ear: Tympanic membrane, ear canal and external ear normal. There is no impacted cerumen.      Nose: Nose normal. No congestion or rhinorrhea.      Mouth/Throat:      Mouth: Mucous membranes are moist.      Pharynx: Oropharynx is clear. No oropharyngeal exudate or posterior oropharyngeal erythema.   Eyes:      Extraocular Movements: Extraocular movements intact.      Conjunctiva/sclera: Conjunctivae normal.      Pupils: Pupils are equal, round, and reactive to light.   Cardiovascular:      Rate and Rhythm: Normal rate and regular rhythm.      Heart  "sounds: Normal heart sounds.   Pulmonary:      Effort: Pulmonary effort is normal. No respiratory distress.      Breath sounds: Normal breath sounds.   Abdominal:      General: Bowel sounds are normal.      Palpations: Abdomen is soft.      Tenderness: There is no abdominal tenderness.   Musculoskeletal:         General: Normal range of motion.      Cervical back: Normal range of motion and neck supple.      Right lower leg: No edema.      Left lower leg: No edema.   Lymphadenopathy:      Cervical: No cervical adenopathy.   Skin:     General: Skin is warm and dry.   Neurological:      General: No focal deficit present.      Mental Status: He is alert and oriented to person, place, and time. Mental status is at baseline.   Psychiatric:         Mood and Affect: Mood normal.         Behavior: Behavior normal.         Thought Content: Thought content normal.         Judgment: Judgment normal.         REVIEWED DATA      Labs:    Lab Results   Component Value Date     11/03/2023    K 4.0 11/03/2023    CALCIUM 9.3 11/03/2023    AST 19 05/02/2023    ALT 26 05/02/2023    BUN 15 11/03/2023    CREATININE 0.91 11/03/2023    CREATININE 0.87 06/30/2023    CREATININE 0.87 05/02/2023    EGFRIFNONA 68 07/09/2020       Lab Results   Component Value Date    GLUCOSE 94 11/03/2023    HGBA1C 5.50 05/02/2023    TSH 1.600 05/02/2023    FREET4 1.34 05/02/2023       No results found for: \"PSA\"    [unfilled]    Lab Results   Component Value Date     (H) 05/02/2023    HDL 40 05/02/2023    TRIG 206 (H) 05/02/2023       No components found for: \"GKWW322N\"    Lab Results   Component Value Date    WBC 6.28 11/03/2023    HGB 13.6 11/03/2023    MCV 89.4 11/03/2023     11/03/2023       Lab Results   Component Value Date    GLUCOSEU Negative 11/21/2019    BLOODU Trace (A) 11/21/2019    NITRITEU Negative 11/21/2019    LEUKOCYTESUR Trace (A) 11/21/2019        No results found for: \"HEPCVIRUSABY\"    Imaging:           Medical Tests: "           ASSESSMENT & PLAN     ANNUAL WELLNESS EXAM / PHYSICAL     Diagnoses and all orders for this visit:    1. Annual physical exam (Primary)  -     CBC & Differential  -     Hemoglobin A1c  -     TSH+Free T4  -     Urinalysis With Microscopic If Indicated (No Culture) - Urine, Clean Catch    2. Mixed hyperlipidemia  -     Comprehensive Metabolic Panel  -     Lipid Panel With / Chol / HDL Ratio    3. Class 3 severe obesity due to excess calories with serious comorbidity and body mass index (BMI) of 45.0 to 49.9 in adult  -     Ambulatory Referral to Sleep Medicine    4. Encounter for hepatitis C screening test for low risk patient  -     Hepatitis C Antibody         Summary/Discussion:     For BMI 47, we discussed R weight loss program.  Offered to discuss medication options, including GLP-1 use, and he declines.  Would like to focus on diet/ exercise improvements.  Recommend evaluation for ZAK with sleep medicine.  He is agreeable for referral.  Continue to follow up with cardiology.      Next Appointment with me: Visit date not found    Return for 6 month chronic care, 1 year annual physical.      HEALTHCARE MAINTENANCE ISSUES       Cancer Screening:  Colon: Initial/Next screening at age: 45  Repeat colon cancer screening: N/A at this time  Prostate: Start screening at 50 then annually  Testicular: Recommended monthly self exam  Skin: Monthly self skin examination, annual exam by health professional  Lung: Does not meet criteria for lung cancer screening.   Other:    Screening Labs & Tests:  Lab results reviewed & discussed with with patient or orders placed today.  EKG:  CV Screening: Lipid panel  DEXA (75+ or risk factors):   HEP C (If born [birth year]38-49[birth year] or risk factors): Ordered  Other:     Immunization/Vaccinations (to be given today unless deferred by patient)  Influenza: Recommended annual influenza vaccine  Hepatitis A: Verify immunization records  Hepatitis B: Verify immunization  records  Tetanus/Pertussis: Up to date  Pneumovax/PCV: Not needed at this time  Shingles: Not needed at this time  COVID: Does not plan to get the latest booster  Lifestyle Counseling:  Lifestyle Modifications: Attempt to lose weight, Continue good lifestyle choices/modifications, Begin progressive aerobic exercise program 3-5 days a week, Follow a low fat, low cholesterol diet, and Reduce exposure to stress if possible  Safety Issues: Always wear seatbelt, Avoid texting while driving   Use sunscreen, regular skin examination  Recommended annual dental/vision examination.  Emotional/Stress/Sleep: Reviewed and  given when appropriate      Health Maintenance   Topic Date Due    HEPATITIS C SCREENING  Never done    LIPID PANEL  05/02/2024    COVID-19 Vaccine (4 - 2023-24 season) 06/07/2024 (Originally 9/1/2023)    INFLUENZA VACCINE  08/01/2024    BMI FOLLOWUP  11/01/2024    ANNUAL PHYSICAL  06/05/2025    TDAP/TD VACCINES (3 - Td or Tdap) 10/29/2032    Pneumococcal Vaccine 0-64  Aged Out

## 2024-06-06 DIAGNOSIS — D72.89 HIGH GRANULOCYTE COUNT: Primary | ICD-10-CM

## 2024-06-06 LAB
ALBUMIN SERPL-MCNC: 4.6 G/DL (ref 3.5–5.2)
ALBUMIN/GLOB SERPL: 1.9 G/DL
ALP SERPL-CCNC: 43 U/L (ref 39–117)
ALT SERPL-CCNC: 57 U/L (ref 1–41)
APPEARANCE UR: ABNORMAL
AST SERPL-CCNC: 29 U/L (ref 1–40)
BASOPHILS # BLD AUTO: 0.02 10*3/MM3 (ref 0–0.2)
BASOPHILS NFR BLD AUTO: 0.3 % (ref 0–1.5)
BILIRUB SERPL-MCNC: 0.9 MG/DL (ref 0–1.2)
BILIRUB UR QL STRIP: NEGATIVE
BUN SERPL-MCNC: 17 MG/DL (ref 6–20)
BUN/CREAT SERPL: 18.1 (ref 7–25)
CALCIUM SERPL-MCNC: 9.5 MG/DL (ref 8.6–10.5)
CHLORIDE SERPL-SCNC: 107 MMOL/L (ref 98–107)
CHOLEST SERPL-MCNC: 248 MG/DL (ref 0–200)
CHOLEST/HDLC SERPL: 5.06 {RATIO}
CO2 SERPL-SCNC: 24.5 MMOL/L (ref 22–29)
COLOR UR: YELLOW
CREAT SERPL-MCNC: 0.94 MG/DL (ref 0.76–1.27)
EGFRCR SERPLBLD CKD-EPI 2021: 105.8 ML/MIN/1.73
EOSINOPHIL # BLD AUTO: 0.1 10*3/MM3 (ref 0–0.4)
EOSINOPHIL NFR BLD AUTO: 1.4 % (ref 0.3–6.2)
ERYTHROCYTE [DISTWIDTH] IN BLOOD BY AUTOMATED COUNT: 12.9 % (ref 12.3–15.4)
GLOBULIN SER CALC-MCNC: 2.4 GM/DL
GLUCOSE SERPL-MCNC: 93 MG/DL (ref 65–99)
GLUCOSE UR QL STRIP: NEGATIVE
HBA1C MFR BLD: 5.7 % (ref 4.8–5.6)
HCT VFR BLD AUTO: 43.9 % (ref 37.5–51)
HCV IGG SERPL QL IA: NON REACTIVE
HDLC SERPL-MCNC: 49 MG/DL (ref 40–60)
HGB BLD-MCNC: 14.7 G/DL (ref 13–17.7)
HGB UR QL STRIP: NEGATIVE
IMM GRANULOCYTES # BLD AUTO: 0.12 10*3/MM3 (ref 0–0.05)
IMM GRANULOCYTES NFR BLD AUTO: 1.7 % (ref 0–0.5)
KETONES UR QL STRIP: NEGATIVE
LDLC SERPL CALC-MCNC: 163 MG/DL (ref 0–100)
LEUKOCYTE ESTERASE UR QL STRIP: NEGATIVE
LYMPHOCYTES # BLD AUTO: 1.87 10*3/MM3 (ref 0.7–3.1)
LYMPHOCYTES NFR BLD AUTO: 25.8 % (ref 19.6–45.3)
MCH RBC QN AUTO: 29.4 PG (ref 26.6–33)
MCHC RBC AUTO-ENTMCNC: 33.5 G/DL (ref 31.5–35.7)
MCV RBC AUTO: 87.8 FL (ref 79–97)
MONOCYTES # BLD AUTO: 0.67 10*3/MM3 (ref 0.1–0.9)
MONOCYTES NFR BLD AUTO: 9.3 % (ref 5–12)
NEUTROPHILS # BLD AUTO: 4.46 10*3/MM3 (ref 1.7–7)
NEUTROPHILS NFR BLD AUTO: 61.5 % (ref 42.7–76)
NITRITE UR QL STRIP: NEGATIVE
NRBC BLD AUTO-RTO: 0 /100 WBC (ref 0–0.2)
PH UR STRIP: 7 [PH] (ref 5–8)
PLATELET # BLD AUTO: 213 10*3/MM3 (ref 140–450)
POTASSIUM SERPL-SCNC: 4.2 MMOL/L (ref 3.5–5.2)
PROT SERPL-MCNC: 7 G/DL (ref 6–8.5)
PROT UR QL STRIP: ABNORMAL
RBC # BLD AUTO: 5 10*6/MM3 (ref 4.14–5.8)
SODIUM SERPL-SCNC: 142 MMOL/L (ref 136–145)
SP GR UR STRIP: 1.02 (ref 1–1.03)
T4 FREE SERPL-MCNC: 1.09 NG/DL (ref 0.92–1.68)
TRIGL SERPL-MCNC: 198 MG/DL (ref 0–150)
TSH SERPL DL<=0.005 MIU/L-ACNC: 1.69 UIU/ML (ref 0.27–4.2)
UROBILINOGEN UR STRIP-MCNC: ABNORMAL MG/DL
VLDLC SERPL CALC-MCNC: 36 MG/DL (ref 5–40)
WBC # BLD AUTO: 7.24 10*3/MM3 (ref 3.4–10.8)

## 2024-07-29 ENCOUNTER — OFFICE VISIT (OUTPATIENT)
Dept: SLEEP MEDICINE | Facility: HOSPITAL | Age: 39
End: 2024-07-29
Payer: COMMERCIAL

## 2024-07-29 VITALS — HEART RATE: 86 BPM | HEIGHT: 71 IN | OXYGEN SATURATION: 94 % | WEIGHT: 315 LBS | BODY MASS INDEX: 44.1 KG/M2

## 2024-07-29 DIAGNOSIS — G47.33 OSA (OBSTRUCTIVE SLEEP APNEA): ICD-10-CM

## 2024-07-29 DIAGNOSIS — E66.01 CLASS 3 SEVERE OBESITY DUE TO EXCESS CALORIES WITH SERIOUS COMORBIDITY AND BODY MASS INDEX (BMI) OF 45.0 TO 49.9 IN ADULT: Primary | ICD-10-CM

## 2024-07-29 PROCEDURE — G0463 HOSPITAL OUTPT CLINIC VISIT: HCPCS

## 2024-07-29 PROCEDURE — 99204 OFFICE O/P NEW MOD 45 MIN: CPT | Performed by: PSYCHIATRY & NEUROLOGY

## 2024-07-29 NOTE — PROGRESS NOTES
"  Reason for Consultation: Sleep apnea        Patient Care Team:  Estephania Walker APRN as PCP - General (Family Medicine)  Samson Harris MD as Consulting Physician (Cardiology)  Fabian Harrington MD, MPH as Consulting Physician (Sleep Medicine)      History of present illness:    Thank you for asking me to see your patient.  The patient is a 39 y.o. male was evaluated with a in-lab polysomnogram in January 2016 at [facility] and he was told he had borderline sleep apnea.  Presently he is sleepy and notes that his his weight has gone up so is his tiredness.  He states that he is lost 90 pounds but has gained it all back.  His wife sleeps in the bedroom with him but she falls asleep so quickly that she is not really able to provide collateral history to him.  He has trouble falling and staying asleep and he is a restless sleeper.  He has developed hypertension recently.  He works as a  for an IT system.  Never used tobacco has rare alcohol in 2-3 sodas per day.    [place]: 3    Data Reviewed: Reviewed his medical chart and sleep questionnaire.      PMH:  Past Medical History:   Diagnosis Date    Asthma     history as a child    Headache Since i was a kid    They fo not happen as often now that i have been eating better    HL (hearing loss) Since i was a kid    Left ear has had multiple surgeries    Kidney stone     Migraine     treats with tylenol or advil and goes to sleep    Pre-excitation syndrome     followed by Dr. Harris after ABN EKG 6/30/2023    Umbilical hernia 11/03/2023          Allergies:  Patient has no known allergies.     Medication Review:   No current outpatient medications on file prior to visit.     No current facility-administered medications on file prior to visit.         Vital Signs:    Vitals:    07/29/24 0700   Pulse: 86   SpO2: 94%   Weight: (!) 152 kg (334 lb)   Height: 180.3 cm (71\")        Body mass index is 46.58 kg/m².  Neck Circumference: 19 " inches  .BMIFOLLOWUP         Physical Exam:    Constitutional:  Well developed 39 y.o. male that appears in no apparent distress.  Awake & oriented times 3.  Normal mood with normal recent and remote memory and normal judgement.  Eyes:  Conjunctivae normal.  Oropharynx: Moist mucous membranes without exudate and Mallampati 4  Neck: Trachea midline  Respiratory: Effort is not labored  Cardiovascular: Radial pulse regular  Musculoskeletal: Gait appears normal, no digital clubbing evident, no pre-tibial edema        Impression:   Encounter Diagnoses   Name Primary?    Class 3 severe obesity due to excess calories with serious comorbidity and body mass index (BMI) of 45.0 to 49.9 in adult Yes    ZAK (obstructive sleep apnea)      Patient's BMI is Body mass index is 46.58 kg/m².    I think it is probable that this patient will have moderately severe sleep apnea or worse.    Plan:    Home sleep apnea test    The patient should practice good sleep hygiene measures.      Weight loss might be beneficial in this patient who has a Body mass index is 46.58 kg/m².      Pathophysiology of ZAK described to the patient.  Cardiovascular complications of untreated ZAK also reviewed.      The patient was cautioned about the dangers of drowsy driving.    Nelson Harrington MD  Sleep Medicine  07/29/24  09:14 EDT

## 2024-08-01 ENCOUNTER — OFFICE VISIT (OUTPATIENT)
Dept: CARDIOLOGY | Facility: CLINIC | Age: 39
End: 2024-08-01
Payer: COMMERCIAL

## 2024-08-01 VITALS
HEIGHT: 71 IN | BODY MASS INDEX: 44.1 KG/M2 | HEART RATE: 72 BPM | WEIGHT: 315 LBS | DIASTOLIC BLOOD PRESSURE: 84 MMHG | SYSTOLIC BLOOD PRESSURE: 126 MMHG

## 2024-08-01 DIAGNOSIS — R94.31 ABNORMAL EKG: ICD-10-CM

## 2024-08-01 DIAGNOSIS — I45.6 PRE-EXCITATION SYNDROME: Primary | ICD-10-CM

## 2024-08-01 PROCEDURE — 99213 OFFICE O/P EST LOW 20 MIN: CPT | Performed by: INTERNAL MEDICINE

## 2024-08-01 NOTE — PROGRESS NOTES
Subjective:        Torey Coy is a 39 y.o. 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    The following portions of the patient's history were reviewed and updated as appropriate: allergies, current medications, past family history, past medical history, past social history, past surgical history and problem list.    Past Medical History:   Diagnosis Date    Asthma     history as a child    Headache Since i was a kid    They fo not happen as often now that i have been eating better    HL (hearing loss) Since i was a kid    Left ear has had multiple surgeries    Kidney stone     Migraine     treats with tylenol or advil and goes to sleep    Pre-excitation syndrome     followed by Dr. Harris after ABN EKG 6/30/2023    Umbilical hernia 11/03/2023     reports that he has never smoked. He has never been exposed to tobacco smoke. He has never used smokeless tobacco. He reports current alcohol use. He reports that he does not use drugs.   Family History   Problem Relation Age of Onset    Heart disease Mother     Heart failure Mother     Diabetes type II Mother     Hearing loss Father         The [place] family has always been hard to hear my whole life    Hypertension Father     Congenital heart disease Sister     Thyroid cancer Sister     Heart disease Paternal Grandfather         Had multiple bypasess and passed away from heart attack    Heart attack Paternal Grandfather     Malig Hyperthermia Neg Hx        Review of Systems  Constitutional: No wt loss, fever,  "fatigue  Gastrointestinal: No nausea, abdominal pain  Behavioral/Psych: No insomnia or anxiety   Cardiovascular no chest pains or tightness in the chest  Objective:       Physical Exam  /84   Pulse 72   Ht 180.3 cm (71\")   Wt (!) 152 kg (336 lb)   BMI 46.86 kg/m²   General appearance: No acute changes   Neck: Trachea midline; NECK, supple, no thyromegaly or lymphadenopathy   Lungs: Normal size and shape, normal breath sounds, equal distribution of air, no rales and rhonchi   CV: S1-S2 regular, no murmurs, no rub, no gallop   Abdomen: Soft, nontender; no masses , no abnormal abdominal sounds   Extremities: No deformity , normal color , no peripheral edema   Skin: Normal temperature, turgor and texture; no rash, ulcers          Procedures      Echocardiogram:    Results for orders placed in visit on 07/13/23    Adult Transthoracic Echo Complete W/ Cont if Necessary Per Protocol    Interpretation Summary    Left ventricular ejection fraction appears to be 61 - 65%.    Left ventricular diastolic function was normal.    Estimated right ventricular systolic pressure from tricuspid regurgitation is normal (<35 mmHg).        No current outpatient medications on file.   Assessment:                Plan:          ICD-10-CM ICD-9-CM   1. Pre-excitation syndrome  I45.6 426.7   2. Abnormal EKG  R94.31 794.31     1. Pre-excitation syndrome  Asymptomatic    2. Abnormal EKG  Asymptomatic      1 yr   COUNSELING:    Torey Trammell was given to patient for the following topics: diagnostic results, risk factor reductions, impressions, risks and benefits of treatment options and importance of treatment compliance .       SMOKING COUNSELING:        Dictated using Dragon dictation    "

## 2024-08-06 ENCOUNTER — OFFICE VISIT (OUTPATIENT)
Dept: INTERNAL MEDICINE | Age: 39
End: 2024-08-06
Payer: COMMERCIAL

## 2024-08-06 VITALS
BODY MASS INDEX: 44.1 KG/M2 | HEIGHT: 71 IN | SYSTOLIC BLOOD PRESSURE: 118 MMHG | HEART RATE: 77 BPM | OXYGEN SATURATION: 96 % | DIASTOLIC BLOOD PRESSURE: 80 MMHG | WEIGHT: 315 LBS

## 2024-08-06 DIAGNOSIS — M25.531 RIGHT WRIST PAIN: Primary | ICD-10-CM

## 2024-08-06 PROCEDURE — 99213 OFFICE O/P EST LOW 20 MIN: CPT

## 2024-08-06 NOTE — PROGRESS NOTES
"    I N T E R N A L  M E D I C I N E  TETE Rojo    ENCOUNTER DATE:  08/06/2024    Torey Coy / 39 y.o. / male      CHIEF COMPLAINT / REASON FOR OFFICE VISIT     Wrist Pain (Right - woke up with pain about a week ago - wore OTC brace at night and reports it improves SX)      ASSESSMENT & PLAN     Diagnoses and all orders for this visit:    1. Right wrist pain (Primary)         SUMMARY/DISCUSSION  Possible right carpal tunnel vs cubital tunnel syndrome (most likely given fourth and fifth digit numbness and negative Phalen's/ Tinel's).  Recommend continued use of wrist brace, ice PRN, gentle stretching.  May use Naproxen 500 mg BID for 5 days to help with inflammation or pain.  Return for non improving or worsening symptoms.  At that time, consider imaging, possible EMG.      Next Appointment with me: 12/5/2024    Return for Next scheduled follow up.      VITAL SIGNS     Visit Vitals  /80 (BP Location: Right arm, Patient Position: Sitting, Cuff Size: Large Adult)   Pulse 77   Ht 180.3 cm (71\")   Wt (!) 153 kg (337 lb)   SpO2 96%   BMI 47.00 kg/m²             Wt Readings from Last 3 Encounters:   08/06/24 (!) 153 kg (337 lb)   08/01/24 (!) 152 kg (336 lb)   07/29/24 (!) 152 kg (334 lb)     Body mass index is 47 kg/m².        MEDICATIONS AT THE TIME OF OFFICE VISIT     No current outpatient medications on file prior to visit.     No current facility-administered medications on file prior to visit.        HISTORY OF PRESENT ILLNESS     Accompanied by his wife, Darby, to today's appointment.    Woke up last week with acute dorsal right wrist pain, along with fingertip numbness of right ring and fifth digit.  Right handed.  No recent trauma or injury.  No neck or elbow pain.  Does have intermittent right shoulder pain at times.  Starting wearing a brace at night with symptom benefit.      Patient Care Team:  Estephania Walker APRN as PCP - General (Family Medicine)  Samson Harris MD as " Consulting Physician (Cardiology)    REVIEW OF SYSTEMS     Review of Systems   Constitutional:  Negative for chills, fever and unexpected weight change.   Respiratory:  Negative for cough, chest tightness and shortness of breath.    Cardiovascular:  Negative for chest pain, palpitations and leg swelling.   Musculoskeletal:  Positive for arthralgias (Right wrist).   Neurological:  Negative for dizziness, weakness, light-headedness and headaches.   Psychiatric/Behavioral:  The patient is not nervous/anxious.           PHYSICAL EXAMINATION     Physical Exam  Vitals reviewed.   Constitutional:       General: He is not in acute distress.     Appearance: Normal appearance. He is not ill-appearing, toxic-appearing or diaphoretic.   HENT:      Head: Normocephalic and atraumatic.   Cardiovascular:      Rate and Rhythm: Normal rate and regular rhythm.      Pulses: Normal pulses.      Heart sounds: Normal heart sounds.   Pulmonary:      Effort: Pulmonary effort is normal.      Breath sounds: Normal breath sounds.   Musculoskeletal:         General: No swelling or tenderness.      Right wrist: No swelling or tenderness. Normal range of motion. Normal pulse.      Comments: Negative Phalen's and Tinel's   Neurological:      Mental Status: He is alert and oriented to person, place, and time. Mental status is at baseline.   Psychiatric:         Mood and Affect: Mood normal.         Behavior: Behavior normal.         Thought Content: Thought content normal.         Judgment: Judgment normal.           REVIEWED DATA     Labs:           Imaging:            Medical Tests:           Summary of old records / correspondence / consultant report:           Request outside records:

## 2024-08-07 ENCOUNTER — TELEPHONE (OUTPATIENT)
Dept: SLEEP MEDICINE | Facility: HOSPITAL | Age: 39
End: 2024-08-07
Payer: COMMERCIAL

## 2024-08-21 ENCOUNTER — HOSPITAL ENCOUNTER (OUTPATIENT)
Dept: SLEEP MEDICINE | Facility: HOSPITAL | Age: 39
End: 2024-08-21
Payer: COMMERCIAL

## 2024-08-21 PROCEDURE — 95806 SLEEP STUDY UNATT&RESP EFFT: CPT

## 2024-08-29 DIAGNOSIS — G47.33 OSA (OBSTRUCTIVE SLEEP APNEA): ICD-10-CM

## 2024-08-29 DIAGNOSIS — E66.01 CLASS 3 SEVERE OBESITY DUE TO EXCESS CALORIES WITH SERIOUS COMORBIDITY AND BODY MASS INDEX (BMI) OF 45.0 TO 49.9 IN ADULT: Primary | ICD-10-CM

## 2024-09-03 ENCOUNTER — TELEPHONE (OUTPATIENT)
Dept: SLEEP MEDICINE | Facility: HOSPITAL | Age: 39
End: 2024-09-03
Payer: COMMERCIAL

## 2024-11-07 ENCOUNTER — TELEPHONE (OUTPATIENT)
Dept: INTERNAL MEDICINE | Age: 39
End: 2024-11-07

## 2024-11-07 NOTE — TELEPHONE ENCOUNTER
Hub staff attempted to follow warm transfer process and was unsuccessful     Caller: Torey Coy    Relationship to patient: Self    Best call back number: [phone]    Patient is needing: PATIENT WOULD LIKE TO RESCHEDULE THE HOSPITAL FOLLOW UP THAT HE MISSED TODAY. IT IS OUTSIDE OF THE ALLOWED TIME FRAME FOR THE HUB TO BE ABLE TO SCHEDULE. PLEASE REACH OUT TO PATIENT TO RESCHEDULE.

## 2024-11-08 ENCOUNTER — OFFICE VISIT (OUTPATIENT)
Dept: INTERNAL MEDICINE | Age: 39
End: 2024-11-08
Payer: COMMERCIAL

## 2024-11-08 VITALS
DIASTOLIC BLOOD PRESSURE: 78 MMHG | TEMPERATURE: 96.1 F | HEIGHT: 71 IN | SYSTOLIC BLOOD PRESSURE: 130 MMHG | RESPIRATION RATE: 18 BRPM | BODY MASS INDEX: 44.1 KG/M2 | HEART RATE: 61 BPM | OXYGEN SATURATION: 94 % | WEIGHT: 315 LBS

## 2024-11-08 DIAGNOSIS — R79.89 ELEVATED LFTS: ICD-10-CM

## 2024-11-08 DIAGNOSIS — K80.20 CALCULUS OF GALLBLADDER WITHOUT CHOLECYSTITIS WITHOUT OBSTRUCTION: ICD-10-CM

## 2024-11-08 DIAGNOSIS — R07.89 OTHER CHEST PAIN: Primary | ICD-10-CM

## 2024-11-08 RX ORDER — PSEUDOEPHEDRINE HCL 30 MG/1
30 TABLET, FILM COATED ORAL EVERY 4 HOURS PRN
COMMUNITY

## 2024-11-08 RX ORDER — FLUTICASONE PROPIONATE 50 MCG
2 SPRAY, SUSPENSION (ML) NASAL DAILY
COMMUNITY

## 2024-11-08 NOTE — PROGRESS NOTES
"                             ELMA GRAY PA-C                 I  N  T  E  R  N  A  L    M  E  D  I  C  I  N  E         ENCOUNTER DATE:  11/08/2024    Torey Coy / 39 y.o. / male      EMERGENCY DEPARTMENT / URGENT CARE FOLLOW-UP VISIT       CHIEF COMPLAINT / REASON FOR OFFICE VISIT     ER follow up  (10/31/24- chest pain with nausea; /They did blood work, CT angiogram abdomen pelvis w contrast, and CTA Chest/ They told the pt to follow up on the pcp first then see cardiologist MELVIN HARDIN MD for a referral), Chest Pain, and Nausea      VITAL SIGNS     Vitals:    11/08/24 0956   BP: 130/78   BP Location: Left arm   Patient Position: Sitting   Cuff Size: Large Adult   Pulse: 61   Resp: 18   Temp: 96.1 °F (35.6 °C)   TempSrc: Temporal   SpO2: 94%   Weight: (!) 152 kg (336 lb)   Height: 180.3 cm (70.98\")       BP Readings from Last 3 Encounters:   11/08/24 130/78   08/06/24 118/80   08/01/24 126/84     Wt Readings from Last 3 Encounters:   11/08/24 (!) 152 kg (336 lb)   08/06/24 (!) 153 kg (337 lb)   08/01/24 (!) 152 kg (336 lb)      Body mass index is 46.88 kg/m².     HISTORY OF PRESENT ILLNESS     Date of admission/discharge: As noted above in CC  Hospital: [facility]   Principle Dx: Non-Specific Chest Pain  Secondary Dx: WPW Syndrome, Elevated LFTs.   History prior to hospitalization: WPW Syndrome, Chest Pain, Obesity.  Evaluation/Treatment: Presented to ED at 12:30 AM on 10/31/24 due to mid-sternal chest pain radiating through his back. Symptoms had lasted for 45 minutes to an hour. EKG readings demonstrated no ST Elevation, but positive for RBBB and WPW. CT Chest impression detailed no pulmonary embolism, no aortic dissection, no aortic aneurysm, with calcified stones in gallbladder lumen.   Course: Discharged hemodynamically stable. Cardiology follow up pending with MELVIN HARDIN MD. Currently no chest pain, shortness of breath, dyspnea, back pain, or nausea on today.  He " states that he has not had a return of his symptoms since his ER visit on 31 of October. He agrees with conservative monitoring of elevated LFTs and cholelithiasis as he has no pain or post-prandial discomfort.     Patient Care Team:  Estephania Walker APRN as PCP - General (Family Medicine)  Samson Harris MD as Consulting Physician (Cardiology)    MEDICATIONS AT THE TIME OF OFFICE VISIT     Current Outpatient Medications on File Prior to Visit   Medication Sig    fluticasone (FLONASE) 50 MCG/ACT nasal spray Administer 2 sprays into the nostril(s) as directed by provider Daily.    pseudoephedrine (SUDAFED) 30 MG tablet Take 1 tablet by mouth Every 4 (Four) Hours As Needed for Congestion.     No current facility-administered medications on file prior to visit.        Current outpatient and discharge medications have been reconciled for the patient.  Reviewed by: Bruce Ching PA-C      ASSESSMENT & PLAN     1. Other chest pain    2. Elevated LFTs    3. Calculus of gallbladder without cholecystitis without obstruction      No orders of the defined types were placed in this encounter.      Summary/Discussion:  Follow-up with cardiology as instructed. Go to ER if symptoms return.   Repeat LFTs with next lab order.  Continue watching gallbladder for any symptoms.       Return if symptoms worsen or fail to improve. And, for Follow-up with TETE Rojo in December..    Future Appointments   Date Time Provider Department Center   12/5/2024 11:30 AM Estephania Walker APRN [facility]   6/11/2025  1:30 PM Estephania Walker APRN [facility]   8/7/2025 10:45 AM Samson Harris MD [facility]       REVIEW OF SYSTEMS     Review of Systems   Constitutional: Negative.    HENT: Negative.     Respiratory: Negative.     Cardiovascular: Negative.    Gastrointestinal: Negative.    Genitourinary: Negative.    Musculoskeletal: Negative.    All other systems reviewed and are negative.        PHYSICAL  EXAMINATION     Physical Exam  Vitals and nursing note reviewed.   Constitutional:       General: He is not in acute distress.     Appearance: Normal appearance. He is not ill-appearing.   Cardiovascular:      Rate and Rhythm: Normal rate and regular rhythm.      Pulses: Normal pulses.      Heart sounds: Normal heart sounds. No murmur heard.     No friction rub. No gallop.   Pulmonary:      Effort: Pulmonary effort is normal. No respiratory distress.      Breath sounds: Normal breath sounds. No stridor. No wheezing.   Neurological:      Mental Status: He is alert.   Psychiatric:         Mood and Affect: Mood normal.         Behavior: Behavior normal.             REVIEWED DATA     Labs:   Lab Results   Component Value Date     06/05/2024    K 4.2 06/05/2024    CALCIUM 9.5 06/05/2024    AST 29 06/05/2024    ALT 57 (H) 06/05/2024    BUN 17 06/05/2024    CREATININE 0.94 06/05/2024    CREATININE 0.91 11/03/2023    CREATININE 0.87 06/30/2023    EGFRIFNONA 68 07/09/2020       Lab Results   Component Value Date    WBC 7.24 06/05/2024    HGB 14.7 06/05/2024    HGB 13.6 11/03/2023    HGB 14.3 06/30/2023     06/05/2024       Lab Results   Component Value Date    PROTEIN Trace (A) 06/05/2024    GLUCOSEU Negative 06/05/2024    BLOODU Negative 06/05/2024    NITRITEU Negative 06/05/2024    LEUKOCYTESUR Negative 06/05/2024           Imaging:   CTA Chest    Result Date: 10/31/2024  Narrative: INDICATION:  Midsternal chest pain radiating to back.  Additional History:  Hernia repair.  Kidney stones. TECHNIQUE:  Helical CT was performed from the lung apices through the symphysis pubis after bolus administration of 100 mL of Isovue 370.  Images were reviewed and processed at a workstation according to the CT angiogram protocol with 3-D and/or MIP post processing imaging generated. Automated mA/kV exposure control was utilized and patient examination was performed in strict accordance with principles of ALARA. RADIATION  AMOUNT:  1271 mGy-cm. COMPARISON:  None Available. FINDINGS: Vascular: Pulmonary arteries:  Pulmonary arteries are adequately opacified without acute or chronic filling defects.   Thoracic aorta:  The thoracic aorta is normal in course and caliber without dissection or aneurysm. Mesenteric: The celiac, SMA, and ERICK demonstrate no significant stenosis. Right renal: Single vessel demonstrates no significant stenosis. Left renal: Single vessel demonstrates no significant stenosis. Abdominal aorta: The abdominal aorta is not aneurysmal and demonstrates no significant occlusive disease. Iliacs: The common, external, and internal iliac vessels demonstrate no aneurysmal disease or significant stenosis. Chest: The heart is normal in size without pericardial effusion.  Thoracic lymph nodes are not enlarged. There is no pleural effusion, pleural thickening, or pneumothorax.  The airways are patent. Lungs are clear without consolidation, interstitial disease, or suspicious nodules. Abdomen: The liver is unremarkable.  Calcified stones are shown in the gallbladder lumen. The pancreas, spleen, adrenal glands, and kidneys demonstrate no acute pathology. No kidney stone. There is no free air or lymph node enlargement. Pelvis: There is no bowel wall thickening or obstruction.  There is no free fluid.  Lymph nodes are not enlarged.  Urinary bladder is unremarkable. Skeleton:   There are no acute fractures.  No suspicious bony lesions. IMPRESSION: No pulmonary embolism.  No aortic dissection, no aortic aneurysm. Negative CT scan of the chest. Cholelithiasis. No findings of an acute process in the abdomen or pelvis. Negative CT angiogram of the aorta and its major branches. Signed by Stephan Dickens MD ##### Final ##### Dictated by:    STEPHAN DICKENS MD-RAD Dictated DT/TM: 10/31/2024 3:22 am Interpreted and electronically signed by:  STEPHAN DICKENS MD-RAD Signed DT/TM:  10/31/2024 3:49 am    CT angiogram abdomen pelvis w  contrast    Result Date: 10/31/2024  Narrative: INDICATION:  Midsternal chest pain radiating to back.  Additional History:  Hernia repair.  Kidney stones. TECHNIQUE:  Helical CT was performed from the lung apices through the symphysis pubis after bolus administration of 100 mL of Isovue 370.  Images were reviewed and processed at a workstation according to the CT angiogram protocol with 3-D and/or MIP post processing imaging generated. Automated mA/kV exposure control was utilized and patient examination was performed in strict accordance with principles of ALARA. RADIATION AMOUNT:  1271 mGy-cm. COMPARISON:  None Available. FINDINGS: Vascular: Pulmonary arteries:  Pulmonary arteries are adequately opacified without acute or chronic filling defects.   Thoracic aorta:  The thoracic aorta is normal in course and caliber without dissection or aneurysm. Mesenteric: The celiac, SMA, and ERICK demonstrate no significant stenosis. Right renal: Single vessel demonstrates no significant stenosis. Left renal: Single vessel demonstrates no significant stenosis. Abdominal aorta: The abdominal aorta is not aneurysmal and demonstrates no significant occlusive disease. Iliacs: The common, external, and internal iliac vessels demonstrate no aneurysmal disease or significant stenosis. Chest: The heart is normal in size without pericardial effusion.  Thoracic lymph nodes are not enlarged. There is no pleural effusion, pleural thickening, or pneumothorax.  The airways are patent. Lungs are clear without consolidation, interstitial disease, or suspicious nodules. Abdomen: The liver is unremarkable.  Calcified stones are shown in the gallbladder lumen. The pancreas, spleen, adrenal glands, and kidneys demonstrate no acute pathology. No kidney stone. There is no free air or lymph node enlargement. Pelvis: There is no bowel wall thickening or obstruction.  There is no free fluid.  Lymph nodes are not enlarged.  Urinary bladder is  unremarkable. Skeleton:   There are no acute fractures.  No suspicious bony lesions. IMPRESSION: No pulmonary embolism.  No aortic dissection, no aortic aneurysm. Negative CT scan of the chest. Cholelithiasis. No findings of an acute process in the abdomen or pelvis. Negative CT angiogram of the aorta and its major branches. Signed by Stephan Dickens MD ##### Final ##### Dictated by:    STEPHAN DICKENS MD-RAD Dictated DT/TM: 10/31/2024 3:22 am Interpreted and electronically signed by:  STEPHAN DICKENS MD-RAD Signed DT/TM:  10/31/2024 3:49 am                  Medical Tests:                  Summary of old records / correspondence / consultant report:   H&P, DC summary re: issues addressed on HPI, and ED encounter note re: issues addressed on HPI              Request outside records:

## 2024-11-10 PROBLEM — R79.89 ELEVATED LFTS: Status: ACTIVE | Noted: 2024-11-10

## 2024-11-10 PROBLEM — R07.89 OTHER CHEST PAIN: Status: ACTIVE | Noted: 2024-11-10

## 2024-11-10 PROBLEM — K80.20 CALCULUS OF GALLBLADDER WITHOUT CHOLECYSTITIS WITHOUT OBSTRUCTION: Status: ACTIVE | Noted: 2024-11-10

## 2024-12-02 NOTE — PROGRESS NOTES
"    I N T E R N A L  M E D I C I N E  Estephania Walker, TETE    ENCOUNTER DATE:  12/05/2024    Torey Coy / 39 y.o. / male      CHIEF COMPLAINT / REASON FOR OFFICE VISIT     Hyperlipidemia and Prediabetes      ASSESSMENT & PLAN     Diagnoses and all orders for this visit:    1. Mixed hyperlipidemia (Primary)  -     Comprehensive Metabolic Panel  -     Lipid Panel With / Chol / HDL Ratio    2. Prediabetes  -     Comprehensive Metabolic Panel  -     Hemoglobin A1c    3. ZAK (obstructive sleep apnea)    4. Class 3 severe obesity due to excess calories with serious comorbidity and body mass index (BMI) of 45.0 to 49.9 in adult    5. High granulocyte count  -     CBC & Differential    6. Encounter for immunization  -     Fluzone >6mos ([id number])         SUMMARY/DISCUSSION  Encouraged to schedule follow up with cardiology after his recent hospital admission.  He states he will do so.  For HLD, discussed recommendation to start daily statin.  He is agreeable to start pending lab review.  Reviewed risks, benefits, side effects.  He will work towards improved low fat/ low carb diet.  Encouraged patient to wear CPAP nightly.  Discussed risks, benefits, side effects of GLP-1 medication use.  Believe patient would benefit greatly due to cardiovascular disease reduction and to help with weight loss.  He declines at this time.    Class 3 Severe Obesity (BMI >=40). Obesity-related health conditions include the following: obstructive sleep apnea, hypertension, impaired fasting glucose, and dyslipidemias. Obesity is unchanged. BMI is is above average; BMI management plan is completed. We discussed portion control, increasing exercise, and pharmacologic options including GLP-1 agents .        Next Appointment with me: Visit date not found    Return in about 3 months (around 3/5/2025) for Chronic care.      VITAL SIGNS     Visit Vitals  /78   Pulse 77   Temp 97.9 °F (36.6 °C)   Ht 180.3 cm (71\")   Wt (!) 153 kg (338 " lb)   SpO2 97%   BMI 47.14 kg/m²             Wt Readings from Last 3 Encounters:   12/05/24 (!) 153 kg (338 lb)   11/08/24 (!) 152 kg (336 lb)   08/06/24 (!) 153 kg (337 lb)     Body mass index is 47.14 kg/m².        MEDICATIONS AT THE TIME OF OFFICE VISIT     Current Outpatient Medications on File Prior to Visit   Medication Sig Dispense Refill    fluticasone (FLONASE) 50 MCG/ACT nasal spray Administer 2 sprays into the nostril(s) as directed by provider Daily.      pseudoephedrine (SUDAFED) 30 MG tablet Take 1 tablet by mouth Every 4 (Four) Hours As Needed for Congestion.       No current facility-administered medications on file prior to visit.        HISTORY OF PRESENT ILLNESS     Hospitalized at [facility] in October 2024 for chest pain evaluation; he has not yet scheduled follow up with [facility] cardiology office but plans to do so.  Denies any recurrent chest pain.  No dyspnea, palpitations.  Feels well.      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  Next appointment is August 7, 2025.  July 2023 Holter monitor showed NSR with rare PVC.  July 2023 ECHO with EF of 61-65%, normal left ventricular diastolic function.  Treadmill stress test showed no ECG evidence of myocardial ischemia; normal.  Mom with heart disease.  No chest pain, dyspnea.  August 2024 sleep study with moderately severe ZAK; wearing CPAP 3/4 days nightly.       HLD: June 2024 Lipid panel with elevated ; elevated triglycerides 198. Previously declined statin use.  He has not been focused on diet/ exercise as of late.       Prediabetes: June 2024 A1C 5.7.    BMI 47: Weight stable.  Wife recently diagnosed with type 2 diabetes, so he plans on refocusing on diet/ exercise improvements in January 2025, including reduced portion sizes, incorporating more fish.      Diagnosed with idiopathic intracranial hypertension about 6 years ago.  He lost weight with resolution of symptoms.  He is followed  yearly by eye clinic; up to date with vision exam.          Patient Care Team:  Estephania Walker APRN as PCP - General (Family Medicine)  Samson Harris MD as Consulting Physician (Cardiology)    REVIEW OF SYSTEMS     Review of Systems   Constitutional:  Negative for chills, diaphoresis, fatigue, fever and unexpected weight change.   HENT:  Negative for congestion and sore throat.    Respiratory:  Negative for cough, chest tightness and shortness of breath.    Cardiovascular:  Negative for chest pain, palpitations and leg swelling.   Gastrointestinal:  Negative for abdominal pain, nausea and vomiting.   Genitourinary:  Negative for dysuria.   Musculoskeletal:  Negative for myalgias and neck pain.   Skin:  Negative for rash.   Neurological:  Negative for dizziness, weakness, light-headedness, numbness and headaches.   Psychiatric/Behavioral:  The patient is not nervous/anxious.           PHYSICAL EXAMINATION     Physical Exam  Vitals reviewed.   Constitutional:       General: He is not in acute distress.     Appearance: Normal appearance. He is not ill-appearing, toxic-appearing or diaphoretic.   HENT:      Head: Normocephalic and atraumatic.   Cardiovascular:      Rate and Rhythm: Normal rate and regular rhythm.      Heart sounds: Normal heart sounds.   Pulmonary:      Effort: Pulmonary effort is normal.      Breath sounds: Normal breath sounds.   Musculoskeletal:      Right lower leg: No edema.      Left lower leg: No edema.   Neurological:      Mental Status: He is alert and oriented to person, place, and time. Mental status is at baseline.   Psychiatric:         Mood and Affect: Mood normal.         Behavior: Behavior normal.         Thought Content: Thought content normal.         Judgment: Judgment normal.           REVIEWED DATA     Labs:           Imaging:            Medical Tests:           Summary of old records / correspondence / consultant report:           Request outside records:

## 2024-12-05 ENCOUNTER — OFFICE VISIT (OUTPATIENT)
Dept: INTERNAL MEDICINE | Age: 39
End: 2024-12-05
Payer: COMMERCIAL

## 2024-12-05 VITALS
HEART RATE: 77 BPM | WEIGHT: 315 LBS | OXYGEN SATURATION: 97 % | HEIGHT: 71 IN | TEMPERATURE: 97.9 F | SYSTOLIC BLOOD PRESSURE: 116 MMHG | DIASTOLIC BLOOD PRESSURE: 78 MMHG | BODY MASS INDEX: 44.1 KG/M2

## 2024-12-05 DIAGNOSIS — E78.2 MIXED HYPERLIPIDEMIA: Primary | ICD-10-CM

## 2024-12-05 DIAGNOSIS — E66.01 CLASS 3 SEVERE OBESITY DUE TO EXCESS CALORIES WITH SERIOUS COMORBIDITY AND BODY MASS INDEX (BMI) OF 45.0 TO 49.9 IN ADULT: ICD-10-CM

## 2024-12-05 DIAGNOSIS — R73.03 PREDIABETES: ICD-10-CM

## 2024-12-05 DIAGNOSIS — G47.33 OSA (OBSTRUCTIVE SLEEP APNEA): ICD-10-CM

## 2024-12-05 DIAGNOSIS — Z23 ENCOUNTER FOR IMMUNIZATION: ICD-10-CM

## 2024-12-05 DIAGNOSIS — D72.89 HIGH GRANULOCYTE COUNT: ICD-10-CM

## 2024-12-05 DIAGNOSIS — E66.813 CLASS 3 SEVERE OBESITY DUE TO EXCESS CALORIES WITH SERIOUS COMORBIDITY AND BODY MASS INDEX (BMI) OF 45.0 TO 49.9 IN ADULT: ICD-10-CM

## 2024-12-05 PROCEDURE — 90471 IMMUNIZATION ADMIN: CPT

## 2024-12-05 PROCEDURE — 99214 OFFICE O/P EST MOD 30 MIN: CPT

## 2024-12-05 PROCEDURE — 90656 IIV3 VACC NO PRSV 0.5 ML IM: CPT

## 2024-12-05 NOTE — LETTER
[facility]  Vaccine Consent Form    Patient Name:  Torey Coy  Patient :  1985     Vaccine(s) Ordered    Fluzone >6mos ([id number])        Screening Checklist  The following questions should be completed prior to vaccination. If you answer “yes” to any question, it does not necessarily mean you should not be vaccinated. It just means we may need to clarify or ask more questions. If a question is unclear, please ask your healthcare provider to explain it.    Yes No   Any fever or moderate to severe illness today (mild illness and/or antibiotic treatment are not contraindications)?     Do you have a history of a serious reaction to any previous vaccinations, such as anaphylaxis, encephalopathy within 7 days, Guillain-[place] syndrome within 6 weeks, seizure?     Have you received any live vaccine(s) (e.g MMR, LIZ) or any other vaccines in the last month (to ensure duplicate doses aren't given)?     Do you have an anaphylactic allergy to latex (DTaP, DTaP-IPV, Hep A, Hep B, MenB, RV, Td, Tdap), baker’s yeast (Hep B, HPV), polysorbates (RSV, nirsevimab, PCV 20, Rotavirrus, Tdap, Shingrix), or gelatin (LIZ, MMR)?     Do you have an anaphylactic allergy to neomycin (Rabies, LIZ, MMR, IPV, Hep A), polymyxin B (IPV), or streptomycin (IPV)?      Any cancer, leukemia, AIDS, or other immune system disorder? (LIZ, MMR, RV)     Do you have a parent, brother, or sister with an immune system problem (if immune competence of vaccine recipient clinically verified, can proceed)? (MMR, LIZ)     Any recent steroid treatments for >2 weeks, chemotherapy, or radiation treatment? (LIZ, MMR)     Have you received antibody-containing blood transfusions or IVIG in the past 11 months (recommended interval is dependent on product)? (MMR, LIZ)     Have you taken antiviral drugs (acyclovir, famciclovir, valacyclovir for LIZ) in the last 24 or 48 hours, respectively?      Are you pregnant or planning to become pregnant within  "1 month? (LIZ, MMR, HPV, IPV, MenB, Abrexvy; For Hep B- refer to Engerix-B; For RSV - Abrysvo is indicated for 32-36 weeks of pregnancy from September to January)     For infants, have you ever been told your child has had intussusception or a medical emergency involving obstruction of the intestine (Rotavirus)? If not for an infant, can skip this question.         *Ordering Physicians/APC should be consulted if \"yes\" is checked by the patient or guardian above.  I have received, read, and understand the Vaccine Information Statement (VIS) for each vaccine ordered.  I have considered my or my child's health status as well as the health status of my close contacts.  I have taken the opportunity to discuss my vaccine questions with my or my child's health care provider.   I have requested that the ordered vaccine(s) be given to me or my child.  I understand the benefits and risks of the vaccines.  I understand that I should remain in the clinic for 15 minutes after receiving the vaccine(s).  _________________________________________________________  Signature of Patient or Parent/Legal Guardian ____________________  Date     "

## 2024-12-05 NOTE — LETTER
[facility]  Vaccine Consent Form    Patient Name:  Torey Coy  Patient :  1985     Vaccine(s) Ordered    Fluzone >6mos ([id number])        Screening Checklist  The following questions should be completed prior to vaccination. If you answer “yes” to any question, it does not necessarily mean you should not be vaccinated. It just means we may need to clarify or ask more questions. If a question is unclear, please ask your healthcare provider to explain it.    Yes No   Any fever or moderate to severe illness today (mild illness and/or antibiotic treatment are not contraindications)?     Do you have a history of a serious reaction to any previous vaccinations, such as anaphylaxis, encephalopathy within 7 days, Guillain-[place] syndrome within 6 weeks, seizure?     Have you received any live vaccine(s) (e.g MMR, LIZ) or any other vaccines in the last month (to ensure duplicate doses aren't given)?     Do you have an anaphylactic allergy to latex (DTaP, DTaP-IPV, Hep A, Hep B, MenB, RV, Td, Tdap), baker’s yeast (Hep B, HPV), polysorbates (RSV, nirsevimab, PCV 20, Rotavirrus, Tdap, Shingrix), or gelatin (LIZ, MMR)?     Do you have an anaphylactic allergy to neomycin (Rabies, LIZ, MMR, IPV, Hep A), polymyxin B (IPV), or streptomycin (IPV)?      Any cancer, leukemia, AIDS, or other immune system disorder? (LIZ, MMR, RV)     Do you have a parent, brother, or sister with an immune system problem (if immune competence of vaccine recipient clinically verified, can proceed)? (MMR, LIZ)     Any recent steroid treatments for >2 weeks, chemotherapy, or radiation treatment? (LIZ, MMR)     Have you received antibody-containing blood transfusions or IVIG in the past 11 months (recommended interval is dependent on product)? (MMR, LIZ)     Have you taken antiviral drugs (acyclovir, famciclovir, valacyclovir for LIZ) in the last 24 or 48 hours, respectively?      Are you pregnant or planning to become pregnant within  "1 month? (LIZ, MMR, HPV, IPV, MenB, Abrexvy; For Hep B- refer to Engerix-B; For RSV - Abrysvo is indicated for 32-36 weeks of pregnancy from September to January)     For infants, have you ever been told your child has had intussusception or a medical emergency involving obstruction of the intestine (Rotavirus)? If not for an infant, can skip this question.         *Ordering Physicians/APC should be consulted if \"yes\" is checked by the patient or guardian above.  I have received, read, and understand the Vaccine Information Statement (VIS) for each vaccine ordered.  I have considered my or my child's health status as well as the health status of my close contacts.  I have taken the opportunity to discuss my vaccine questions with my or my child's health care provider.   I have requested that the ordered vaccine(s) be given to me or my child.  I understand the benefits and risks of the vaccines.  I understand that I should remain in the clinic for 15 minutes after receiving the vaccine(s).  _________________________________________________________  Signature of Patient or Parent/Legal Guardian ____________________  Date     "

## 2024-12-06 ENCOUNTER — TELEPHONE (OUTPATIENT)
Dept: CARDIOLOGY | Facility: CLINIC | Age: 39
End: 2024-12-06

## 2024-12-06 DIAGNOSIS — D72.89 HIGH GRANULOCYTE COUNT: Primary | ICD-10-CM

## 2024-12-06 DIAGNOSIS — E78.2 MIXED HYPERLIPIDEMIA: Primary | ICD-10-CM

## 2024-12-06 LAB
ALBUMIN SERPL-MCNC: 4.4 G/DL (ref 3.5–5.2)
ALBUMIN/GLOB SERPL: 1.6 G/DL
ALP SERPL-CCNC: 48 U/L (ref 39–117)
ALT SERPL-CCNC: 44 U/L (ref 1–41)
AST SERPL-CCNC: 23 U/L (ref 1–40)
BASOPHILS # BLD AUTO: 0.03 10*3/MM3 (ref 0–0.2)
BASOPHILS NFR BLD AUTO: 0.4 % (ref 0–1.5)
BILIRUB SERPL-MCNC: 0.7 MG/DL (ref 0–1.2)
BUN SERPL-MCNC: 13 MG/DL (ref 6–20)
BUN/CREAT SERPL: 12.4 (ref 7–25)
CALCIUM SERPL-MCNC: 9.4 MG/DL (ref 8.6–10.5)
CHLORIDE SERPL-SCNC: 105 MMOL/L (ref 98–107)
CHOLEST SERPL-MCNC: 250 MG/DL (ref 0–200)
CHOLEST/HDLC SERPL: 6.58 {RATIO}
CO2 SERPL-SCNC: 25.4 MMOL/L (ref 22–29)
CREAT SERPL-MCNC: 1.05 MG/DL (ref 0.76–1.27)
EGFRCR SERPLBLD CKD-EPI 2021: 92.6 ML/MIN/1.73
EOSINOPHIL # BLD AUTO: 0.08 10*3/MM3 (ref 0–0.4)
EOSINOPHIL NFR BLD AUTO: 1.2 % (ref 0.3–6.2)
ERYTHROCYTE [DISTWIDTH] IN BLOOD BY AUTOMATED COUNT: 13.3 % (ref 12.3–15.4)
GLOBULIN SER CALC-MCNC: 2.7 GM/DL
GLUCOSE SERPL-MCNC: 96 MG/DL (ref 65–99)
HBA1C MFR BLD: 5.6 % (ref 4.8–5.6)
HCT VFR BLD AUTO: 42.4 % (ref 37.5–51)
HDLC SERPL-MCNC: 38 MG/DL (ref 40–60)
HGB BLD-MCNC: 13.8 G/DL (ref 13–17.7)
IMM GRANULOCYTES # BLD AUTO: 0.1 10*3/MM3 (ref 0–0.05)
IMM GRANULOCYTES NFR BLD AUTO: 1.4 % (ref 0–0.5)
LDLC SERPL CALC-MCNC: 181 MG/DL (ref 0–100)
LYMPHOCYTES # BLD AUTO: 1.85 10*3/MM3 (ref 0.7–3.1)
LYMPHOCYTES NFR BLD AUTO: 26.8 % (ref 19.6–45.3)
MCH RBC QN AUTO: 28.8 PG (ref 26.6–33)
MCHC RBC AUTO-ENTMCNC: 32.5 G/DL (ref 31.5–35.7)
MCV RBC AUTO: 88.5 FL (ref 79–97)
MONOCYTES # BLD AUTO: 0.52 10*3/MM3 (ref 0.1–0.9)
MONOCYTES NFR BLD AUTO: 7.5 % (ref 5–12)
NEUTROPHILS # BLD AUTO: 4.33 10*3/MM3 (ref 1.7–7)
NEUTROPHILS NFR BLD AUTO: 62.7 % (ref 42.7–76)
NRBC BLD AUTO-RTO: 0 /100 WBC (ref 0–0.2)
PLATELET # BLD AUTO: 211 10*3/MM3 (ref 140–450)
POTASSIUM SERPL-SCNC: 4.3 MMOL/L (ref 3.5–5.2)
PROT SERPL-MCNC: 7.1 G/DL (ref 6–8.5)
RBC # BLD AUTO: 4.79 10*6/MM3 (ref 4.14–5.8)
SODIUM SERPL-SCNC: 140 MMOL/L (ref 136–145)
TRIGL SERPL-MCNC: 167 MG/DL (ref 0–150)
VLDLC SERPL CALC-MCNC: 31 MG/DL (ref 5–40)
WBC # BLD AUTO: 6.91 10*3/MM3 (ref 3.4–10.8)

## 2024-12-06 RX ORDER — ROSUVASTATIN CALCIUM 5 MG/1
5 TABLET, COATED ORAL DAILY
Qty: 90 TABLET | Refills: 0 | Status: SHIPPED | OUTPATIENT
Start: 2024-12-06

## 2024-12-06 NOTE — TELEPHONE ENCOUNTER
Caller: Torey Coy    Relationship to patient: Self    Best call back number: [phone]    Chief complaint:     Type of visit: FOLLOW UP     Requested date: AS SOON AS POSSIBLE     If rescheduling, when is the original appointment: 08.07.25     Additional notes:PATIENT STATED THAT HE WOULD LIKE TO SEE DR. HARDIN AS SOON AS POSSIBLE BECAUSE HE WAS SEEN AT [facility] IN [facility] ON 10.31.24 FOR SHORTNESS OF BREATH AND TIGHTNESS IN HIS CHEST AND AROUND TO HIS BACK AND THEY RECOMMENDED HE SEE DR. HARDIN. PLEASE CONTACT PATIENT. THANK YOU.

## 2024-12-09 DIAGNOSIS — E78.2 MIXED HYPERLIPIDEMIA: ICD-10-CM

## 2024-12-09 DIAGNOSIS — D72.89 HIGH GRANULOCYTE COUNT: ICD-10-CM

## 2024-12-10 ENCOUNTER — OFFICE VISIT (OUTPATIENT)
Dept: CARDIOLOGY | Facility: CLINIC | Age: 39
End: 2024-12-10
Payer: COMMERCIAL

## 2024-12-10 VITALS
DIASTOLIC BLOOD PRESSURE: 85 MMHG | BODY MASS INDEX: 44.1 KG/M2 | HEART RATE: 78 BPM | HEIGHT: 71 IN | SYSTOLIC BLOOD PRESSURE: 128 MMHG | WEIGHT: 315 LBS

## 2024-12-10 DIAGNOSIS — E78.2 MIXED HYPERLIPIDEMIA: ICD-10-CM

## 2024-12-10 DIAGNOSIS — R07.2 PRECORDIAL PAIN: ICD-10-CM

## 2024-12-10 DIAGNOSIS — R94.31 ABNORMAL EKG: Primary | ICD-10-CM

## 2024-12-10 LAB
ALBUMIN SERPL-MCNC: 4.4 G/DL (ref 3.5–5.2)
ALBUMIN/GLOB SERPL: 1.9 G/DL
ALP SERPL-CCNC: 47 U/L (ref 39–117)
ALT SERPL-CCNC: 38 U/L (ref 1–41)
AST SERPL-CCNC: 26 U/L (ref 1–40)
BILIRUB SERPL-MCNC: 0.9 MG/DL (ref 0–1.2)
BUN SERPL-MCNC: 15 MG/DL (ref 6–20)
BUN/CREAT SERPL: 14.7 (ref 7–25)
CALCIUM SERPL-MCNC: 9.1 MG/DL (ref 8.6–10.5)
CHLORIDE SERPL-SCNC: 104 MMOL/L (ref 98–107)
CHOLEST SERPL-MCNC: 238 MG/DL (ref 0–200)
CHOLEST/HDLC SERPL: 6.26 {RATIO}
CO2 SERPL-SCNC: 25.6 MMOL/L (ref 22–29)
CREAT SERPL-MCNC: 1.02 MG/DL (ref 0.76–1.27)
EGFRCR SERPLBLD CKD-EPI 2021: 95.9 ML/MIN/1.73
GLOBULIN SER CALC-MCNC: 2.3 GM/DL
GLUCOSE SERPL-MCNC: 106 MG/DL (ref 65–99)
HDLC SERPL-MCNC: 38 MG/DL (ref 40–60)
LDLC SERPL CALC-MCNC: 167 MG/DL (ref 0–100)
POTASSIUM SERPL-SCNC: 4.3 MMOL/L (ref 3.5–5.2)
PROT SERPL-MCNC: 6.7 G/DL (ref 6–8.5)
SODIUM SERPL-SCNC: 137 MMOL/L (ref 136–145)
TRIGL SERPL-MCNC: 179 MG/DL (ref 0–150)
VLDLC SERPL CALC-MCNC: 33 MG/DL (ref 5–40)

## 2024-12-10 NOTE — PROGRESS NOTES
Subjective:        Torey Coy is a 39 y.o. male who here for follow up    No chief complaint on file.      HPI    This is a 39-year-old male who is known with this provider with history of WPW, asthma, migraines, seen in office today in follow-up from ER visit for chest pain. His CTA chest abdomen and pelvis during the ER visit was negative, and he was discharged home.  He reports that he had lower left chest pain that radiated around his back to his right side, associated with shortness of breath. Denies any lightheadedness/palpitations/dizziness.     Reviewed and still relevant:  -Echo 7/18/2023 EF 61 to 65%, normal LV function.    -Stress test 7/17/2023 was a normal ECG stress test.   -Holter monitor 7/17/2023 was normal sinus rhythm with rare PVC.  Average heart rate 73.    The following portions of the patient's history were reviewed and updated as appropriate: allergies, current medications, past family history, past medical history, past social history, past surgical history and problem list.    Past Medical History:   Diagnosis Date    Asthma     history as a child    Headache Since i was a kid    They fo not happen as often now that i have been eating better    HL (hearing loss) Since i was a kid    Left ear has had multiple surgeries    Hyperlipidemia     Last coupke of test with primary care physician have shown higher levels    Kidney stone     Migraine     treats with tylenol or advil and goes to sleep    ZAK (obstructive sleep apnea) 12/5/2024    Pre-excitation syndrome     followed by Dr. Harris after ABN EKG 6/30/2023    Umbilical hernia 11/03/2023         reports that he has never smoked. He has never been exposed to tobacco smoke. He has never used smokeless tobacco. He reports current alcohol use. He reports that he does not use drugs.     Family History   Problem Relation Age of Onset    Heart disease Mother     Heart failure Mother     Diabetes type II Mother     Hearing  loss Father         The [place]bigniew family has always been hard to hear my whole life    Hypertension Father     Congenital heart disease Sister     Thyroid cancer Sister     Cancer Sister         Had both lymph nodes removed and is kow cancer free    Heart disease Paternal Grandfather         Had multiple bypasess and passed away from heart attack    Heart attack Paternal Grandfather     Heart disease Sister         We noth have Hillman-Parkinson-White syndrome but Milagros was treated for and had undergone surgery to correct when she was a teenager    Malig Hyperthermia Neg Hx        ROS     Review of Systems  Constitutional: No wt loss, fever, fatigue  Gastrointestinal: No nausea, abdominal pain  Behavioral/Psych: No insomnia or anxiety  Cardiovascular no chest pain or shortness of breath      Objective:           Vitals and nursing note reviewed.   Constitutional:       Appearance: Well-developed.   HENT:      Head: Normocephalic.      Right Ear: External ear normal.      Left Ear: External ear normal.   Neck:      Vascular: No JVD.   Pulmonary:      Effort: Pulmonary effort is normal. No respiratory distress.      Breath sounds: Normal breath sounds. No stridor. No rales.   Cardiovascular:      Normal rate. Regular rhythm.      No gallop.    Pulses:     Intact distal pulses.   Edema:     Peripheral edema absent.   Abdominal:      General: Bowel sounds are normal. There is no distension.      Palpations: Abdomen is soft.      Tenderness: There is no abdominal tenderness. There is no guarding.   Musculoskeletal: Normal range of motion.         General: No tenderness.      Cervical back: Normal range of motion. Skin:     General: Skin is warm.   Neurological:      Mental Status: Alert and oriented to person, place, and time.      Deep Tendon Reflexes: Reflexes are normal and symmetric.   Psychiatric:         Judgment: Judgment normal.           ECG 12 Lead    Date/Time: 12/10/2024 1:05 PM  Performed by: Arlette Hemphill  APRN    Authorized by: Arlette Hemphill APRN  Comparison: compared with previous ECG from 8/1/2024  Similar to previous ECG  Rhythm: sinus rhythm  BPM: 79  Conduction comments: wpw    Clinical impression: abnormal EKG          Interpretation Summary         A normal monitor study.    Torey Coy monitored for 22 h 3 min starting on 07/17/2023.  Primary rhythm was Sinus Rhythm.  The average heart rate, excluding ectopy, was 73 BPM with a minumim of 55 BPM  on Day 2 and a maximum of 120 BPM on Day 1. PVC: [place] was <0.01 %, max count per 24 hours 9,2 disparate morphologies    NSR WITH RARE PVC    Interpretation Summary         No ECG evidence of myocardial ischemia.    Negative clinical evidence of myocardial ischemia.    Findings consistent with a normal ECG stress test.    No ECG evidence of myocardial ischemia. Negative clinical evidence of myocardial ischemia. Findings consistent with a normal ECG stress test    Interpretation Summary         Left ventricular ejection fraction appears to be 61 - 65%.    Left ventricular diastolic function was normal.    Estimated right ventricular systolic pressure from tricuspid regurgitation is normal (<35 mmHg).         Current Outpatient Medications:     fluticasone (FLONASE) 50 MCG/ACT nasal spray, Administer 2 sprays into the nostril(s) as directed by provider Daily., Disp: , Rfl:     pseudoephedrine (SUDAFED) 30 MG tablet, Take 1 tablet by mouth Every 4 (Four) Hours As Needed for Congestion., Disp: , Rfl:     rosuvastatin (Crestor) 5 MG tablet, Take 1 tablet by mouth Daily., Disp: 90 tablet, Rfl: 0     Assessment:        Patient Active Problem List   Diagnosis    Pseudotumor cerebri syndrome    Double vision    Umbilical pain    Umbilical hernia without obstruction and without gangrene    Optic nerve disorder    Preop cardiovascular exam    Abnormal EKG    Pre-excitation syndrome    Calculus of gallbladder without cholecystitis without obstruction    Elevated LFTs     Other chest pain    Mixed hyperlipidemia    Prediabetes    ZAK (obstructive sleep apnea)               Plan:   Chest pain: We will proceed with treadmill test and echo    It was explained to the patient that stress testing carries 85% specificity/sensitivity, and does not rule out future cardiac event.  Risks of the procedure were explained to the patient including shortness of breath, induction of myocardial infarction, and dizziness.  Patient is agreeable to proceeding with stress testing.     2.  WPW: Proceeding with treadmill test as above.  2-week Holter monitor.    3. Hyperlipidemia: recently started on crestor, hasn't taken yet, advised to start.     Risk of the hyperlipidemia, importance of the treatment has been explained. Pros and cons of the statins has been explained. Regular blood workup as well as side effects including the liver failure, myelopathy death has been explained.               No diagnosis found.    There are no diagnoses linked to this encounter.    COUNSELING: Manasa Townsendeling was given to patient for the following topics: diagnostic results, risk factor reductions, impressions, risks and benefits of treatment options and importance of treatment compliance .       SMOKING COUNSELING: Denies    Treadmill test and echo  Follow-up    Sincerely,   TETE Moscoso  [facility]  12/10/24  10:57 EST    EMR Dragon/Transcription disclaimer:   Much of this encounter note is an electronic transcription/translation of spoken language to printed text. The electronic translation of spoken language may permit erroneous, or at times, nonsensical words or phrases to be inadvertently transcribed; Although I have reviewed the note for such errors, some may still exist.

## 2024-12-11 ENCOUNTER — TELEPHONE (OUTPATIENT)
Dept: INTERNAL MEDICINE | Age: 39
End: 2024-12-11
Payer: COMMERCIAL

## 2024-12-11 DIAGNOSIS — E78.2 MIXED HYPERLIPIDEMIA: Primary | ICD-10-CM

## 2024-12-11 DIAGNOSIS — D72.89 HIGH GRANULOCYTE COUNT: Primary | ICD-10-CM

## 2024-12-11 NOTE — TELEPHONE ENCOUNTER
Spoke with patient. Advised that the CMP and Lipid panel were drawn too soon. Advised the smear needed to be done and that needed to be done down at the first floor lab. Patient acknowledged. Patient states he thought the reason for the smear was due to elevated WBC. Patient states he had flu shot administered the day of the blood work. Advised I would ask provider about flu vaccine and if that may have caused the elevation. Told patient that if he received a bill for the wrong labs being drawn, that he let our office know so we can take care of it.

## 2024-12-12 ENCOUNTER — LAB (OUTPATIENT)
Facility: HOSPITAL | Age: 39
End: 2024-12-12
Payer: COMMERCIAL

## 2024-12-12 DIAGNOSIS — D72.89 HIGH GRANULOCYTE COUNT: ICD-10-CM

## 2024-12-12 LAB
BASOPHILS # BLD AUTO: 0.05 10*3/MM3 (ref 0–0.2)
BASOPHILS NFR BLD AUTO: 0.7 % (ref 0–1.5)
DEPRECATED RDW RBC AUTO: 40.8 FL (ref 37–54)
EOSINOPHIL # BLD AUTO: 0.13 10*3/MM3 (ref 0–0.4)
EOSINOPHIL NFR BLD AUTO: 1.9 % (ref 0.3–6.2)
ERYTHROCYTE [DISTWIDTH] IN BLOOD BY AUTOMATED COUNT: 13.2 % (ref 12.3–15.4)
HCT VFR BLD AUTO: 41.1 % (ref 37.5–51)
HGB BLD-MCNC: 14.1 G/DL (ref 13–17.7)
IMM GRANULOCYTES # BLD AUTO: 0.14 10*3/MM3 (ref 0–0.05)
IMM GRANULOCYTES NFR BLD AUTO: 2 % (ref 0–0.5)
LYMPHOCYTES # BLD AUTO: 1.95 10*3/MM3 (ref 0.7–3.1)
LYMPHOCYTES NFR BLD AUTO: 28.1 % (ref 19.6–45.3)
MCH RBC QN AUTO: 29.6 PG (ref 26.6–33)
MCHC RBC AUTO-ENTMCNC: 34.3 G/DL (ref 31.5–35.7)
MCV RBC AUTO: 86.3 FL (ref 79–97)
MONOCYTES # BLD AUTO: 0.55 10*3/MM3 (ref 0.1–0.9)
MONOCYTES NFR BLD AUTO: 7.9 % (ref 5–12)
NEUTROPHILS NFR BLD AUTO: 4.11 10*3/MM3 (ref 1.7–7)
NEUTROPHILS NFR BLD AUTO: 59.4 % (ref 42.7–76)
NRBC BLD AUTO-RTO: 0 /100 WBC (ref 0–0.2)
PATHOLOGY REVIEW: YES
PLATELET # BLD AUTO: 197 10*3/MM3 (ref 140–450)
PMV BLD AUTO: 11.2 FL (ref 6–12)
RBC # BLD AUTO: 4.76 10*6/MM3 (ref 4.14–5.8)
WBC NRBC COR # BLD AUTO: 6.93 10*3/MM3 (ref 3.4–10.8)

## 2024-12-12 PROCEDURE — 85025 COMPLETE CBC W/AUTO DIFF WBC: CPT

## 2024-12-13 LAB
LAB AP CASE REPORT: NORMAL
PATH REPORT.FINAL DX SPEC: NORMAL

## 2024-12-30 ENCOUNTER — HOSPITAL ENCOUNTER (OUTPATIENT)
Dept: CARDIOLOGY | Facility: HOSPITAL | Age: 39
Discharge: HOME | End: 2024-12-30
Payer: COMMERCIAL

## 2024-12-30 VITALS
BODY MASS INDEX: 44.1 KG/M2 | HEIGHT: 71 IN | WEIGHT: 315 LBS | DIASTOLIC BLOOD PRESSURE: 88 MMHG | SYSTOLIC BLOOD PRESSURE: 138 MMHG

## 2024-12-30 PROCEDURE — 25510000001 PERFLUTREN 6.52 MG/ML SUSPENSION 2 ML VIAL: Performed by: INTERNAL MEDICINE

## 2024-12-30 PROCEDURE — 93306 TTE W/DOPPLER COMPLETE: CPT | Performed by: INTERNAL MEDICINE

## 2024-12-30 PROCEDURE — 93017 CV STRESS TEST TRACING ONLY: CPT

## 2024-12-30 PROCEDURE — 93306 TTE W/DOPPLER COMPLETE: CPT

## 2024-12-30 RX ADMIN — SODIUM CHLORIDE 3 ML: 9 INJECTION INTRAMUSCULAR; INTRAVENOUS; SUBCUTANEOUS at 14:00

## 2024-12-31 LAB
AORTIC ARCH: 2.8 CM
AORTIC DIMENSIONLESS INDEX: 0.9 (DI)
AV MEAN PRESS GRAD SYS DOP V1V2: 3.3 MMHG
AV VMAX SYS DOP: 126.5 CM/SEC
BH CV ECHO MEAS - ACS: 2.33 CM
BH CV ECHO MEAS - AO MAX PG: 6.4 MMHG
BH CV ECHO MEAS - AO ROOT DIAM: 3.9 CM
BH CV ECHO MEAS - AO V2 VTI: 21.7 CM
BH CV ECHO MEAS - AVA(I,D): 4 CM2
BH CV ECHO MEAS - EDV(CUBED): 48.6 ML
BH CV ECHO MEAS - EDV(MOD-SP2): 156 ML
BH CV ECHO MEAS - EDV(MOD-SP4): 166 ML
BH CV ECHO MEAS - EF(MOD-SP2): 62.8 %
BH CV ECHO MEAS - EF(MOD-SP4): 66.9 %
BH CV ECHO MEAS - ESV(CUBED): 15.6 ML
BH CV ECHO MEAS - ESV(MOD-SP2): 58 ML
BH CV ECHO MEAS - ESV(MOD-SP4): 55 ML
BH CV ECHO MEAS - FS: 31.5 %
BH CV ECHO MEAS - IVS/LVPW: 1.03 CM
BH CV ECHO MEAS - IVSD: 1.32 CM
BH CV ECHO MEAS - LAT PEAK E' VEL: 10.9 CM/SEC
BH CV ECHO MEAS - LV DIASTOLIC VOL/BSA (35-75): 63 CM2
BH CV ECHO MEAS - LV MASS(C)D: 163.7 GRAMS
BH CV ECHO MEAS - LV MAX PG: 4.7 MMHG
BH CV ECHO MEAS - LV MEAN PG: 2.27 MMHG
BH CV ECHO MEAS - LV SYSTOLIC VOL/BSA (12-30): 20.9 CM2
BH CV ECHO MEAS - LV V1 MAX: 108 CM/SEC
BH CV ECHO MEAS - LV V1 VTI: 18.6 CM
BH CV ECHO MEAS - LVIDD: 3.7 CM
BH CV ECHO MEAS - LVIDS: 2.5 CM
BH CV ECHO MEAS - LVOT AREA: 4.7 CM2
BH CV ECHO MEAS - LVOT DIAM: 2.45 CM
BH CV ECHO MEAS - LVPWD: 1.28 CM
BH CV ECHO MEAS - MED PEAK E' VEL: 6.7 CM/SEC
BH CV ECHO MEAS - MV A DUR: 0.1 SEC
BH CV ECHO MEAS - MV A MAX VEL: 70.7 CM/SEC
BH CV ECHO MEAS - MV DEC SLOPE: 364.4 CM/SEC2
BH CV ECHO MEAS - MV DEC TIME: 0.2 SEC
BH CV ECHO MEAS - MV E MAX VEL: 54.4 CM/SEC
BH CV ECHO MEAS - MV E/A: 0.77
BH CV ECHO MEAS - MV MAX PG: 1.93 MMHG
BH CV ECHO MEAS - MV MEAN PG: 0.82 MMHG
BH CV ECHO MEAS - MV P1/2T: 61.1 MSEC
BH CV ECHO MEAS - MV V2 VTI: 22.5 CM
BH CV ECHO MEAS - MVA(P1/2T): 3.6 CM2
BH CV ECHO MEAS - MVA(VTI): 3.9 CM2
BH CV ECHO MEAS - PA ACC TIME: 0.08 SEC
BH CV ECHO MEAS - PA V2 MAX: 88.5 CM/SEC
BH CV ECHO MEAS - PULM A REVS DUR: 0.11 SEC
BH CV ECHO MEAS - PULM A REVS VEL: 27 CM/SEC
BH CV ECHO MEAS - PULM DIAS VEL: 36.1 CM/SEC
BH CV ECHO MEAS - PULM S/D: 1.41
BH CV ECHO MEAS - PULM SYS VEL: 51 CM/SEC
BH CV ECHO MEAS - RAP SYSTOLE: 3 MMHG
BH CV ECHO MEAS - RV MAX PG: 1.86 MMHG
BH CV ECHO MEAS - RV V1 MAX: 68.1 CM/SEC
BH CV ECHO MEAS - RV V1 VTI: 13.8 CM
BH CV ECHO MEAS - SV(LVOT): 87.6 ML
BH CV ECHO MEAS - SV(MOD-SP2): 98 ML
BH CV ECHO MEAS - SV(MOD-SP4): 111 ML
BH CV ECHO MEAS - SVI(LVOT): 33.2 ML/M2
BH CV ECHO MEAS - SVI(MOD-SP2): 37.2 ML/M2
BH CV ECHO MEAS - SVI(MOD-SP4): 42.1 ML/M2
BH CV ECHO MEAS - TAPSE (>1.6): 1.63 CM
BH CV ECHO MEASUREMENTS AVERAGE E/E' RATIO: 6.18
BH CV STRESS BP STAGE 1: NORMAL
BH CV STRESS BP STAGE 2: NORMAL
BH CV STRESS BP STAGE 3: NORMAL
BH CV STRESS DURATION MIN STAGE 1: 3
BH CV STRESS DURATION MIN STAGE 2: 3
BH CV STRESS DURATION MIN STAGE 3: 3
BH CV STRESS DURATION SEC STAGE 1: 0
BH CV STRESS DURATION SEC STAGE 2: 0
BH CV STRESS DURATION SEC STAGE 3: 0
BH CV STRESS GRADE STAGE 1: 10
BH CV STRESS GRADE STAGE 2: 12
BH CV STRESS GRADE STAGE 3: 14
BH CV STRESS HR STAGE 1: 119
BH CV STRESS HR STAGE 2: 146
BH CV STRESS HR STAGE 3: 171
BH CV STRESS METS STAGE 1: 4.6
BH CV STRESS METS STAGE 2: 7.1
BH CV STRESS METS STAGE 3: 10.2
BH CV STRESS PROTOCOL 1: NORMAL
BH CV STRESS RECOVERY BP: NORMAL MMHG
BH CV STRESS RECOVERY HR: 107 BPM
BH CV STRESS RECOVERY O2: 97 %
BH CV STRESS SPEED STAGE 1: 1.7
BH CV STRESS SPEED STAGE 2: 2.5
BH CV STRESS SPEED STAGE 3: 3.4
BH CV STRESS STAGE 1: 1
BH CV STRESS STAGE 2: 2
BH CV STRESS STAGE 3: 3
BH CV XLRA - TDI S': 15.8 CM/SEC
LEFT ATRIUM VOLUME INDEX: 18.5 ML/M2
LV EF BIPLANE MOD: 65.4 %
MAXIMAL PREDICTED HEART RATE: 181 BPM
PERCENT MAX PREDICTED HR: 94.48 %
SINUS: 2.8 CM
STRESS BASELINE BP: NORMAL MMHG
STRESS BASELINE HR: 81 BPM
STRESS O2 SAT REST: 96 %
STRESS PERCENT HR: 111 %
STRESS POST ESTIMATED WORKLOAD: 10.2 METS
STRESS POST EXERCISE DUR MIN: 8 MIN
STRESS POST EXERCISE DUR SEC: 59 SEC
STRESS POST PEAK BP: NORMAL MMHG
STRESS POST PEAK HR: 171 BPM
STRESS TARGET HR: 154 BPM

## 2025-01-07 ENCOUNTER — OFFICE VISIT (OUTPATIENT)
Dept: CARDIOLOGY | Facility: CLINIC | Age: 40
End: 2025-01-07
Payer: COMMERCIAL

## 2025-01-07 VITALS
HEART RATE: 69 BPM | BODY MASS INDEX: 44.1 KG/M2 | DIASTOLIC BLOOD PRESSURE: 92 MMHG | WEIGHT: 315 LBS | HEIGHT: 71 IN | SYSTOLIC BLOOD PRESSURE: 123 MMHG

## 2025-01-07 DIAGNOSIS — I45.6 WPW (WOLFF-PARKINSON-WHITE SYNDROME): ICD-10-CM

## 2025-01-07 DIAGNOSIS — E78.2 MIXED HYPERLIPIDEMIA: Primary | ICD-10-CM

## 2025-01-07 DIAGNOSIS — R07.89 OTHER CHEST PAIN: ICD-10-CM

## 2025-01-07 PROCEDURE — 99213 OFFICE O/P EST LOW 20 MIN: CPT

## 2025-01-07 NOTE — PROGRESS NOTES
Subjective:        Torey Coy is a 39 y.o. male who here for follow up    No chief complaint on file.      HPI    This is a 39-year-old male who is known with this provider with history of WPW, asthma, migraines, seen in office today in follow-up for chest pain. ER visit for chest pain October 2024 At that time CTA chest abdomen and pelvis during the negative.     Echo 12/30/2024 EF 65%, normal LV function.  Treadmill stress test 12/30/2024 was a normal ECG stress test, no ectopy.  Holter monitor benign, 1 episode SVT for 4 beats, 1 episode VT for 3 beats.    The following portions of the patient's history were reviewed and updated as appropriate: allergies, current medications, past family history, past medical history, past social history, past surgical history and problem list.    Past Medical History:   Diagnosis Date    Asthma     history as a child    Headache Since i was a kid    They fo not happen as often now that i have been eating better    HL (hearing loss) Since i was a kid    Left ear has had multiple surgeries    Hyperlipidemia     Last coupke of test with primary care physician have shown higher levels    Kidney stone     Migraine     treats with tylenol or advil and goes to sleep    ZAK (obstructive sleep apnea) 12/5/2024    Pre-excitation syndrome     followed by Dr. Harris after ABN EKG 6/30/2023    Umbilical hernia 11/03/2023         reports that he has never smoked. He has never been exposed to tobacco smoke. He has never used smokeless tobacco. He reports current alcohol use. He reports that he does not use drugs.     Family History   Problem Relation Age of Onset    Heart disease Mother     Heart failure Mother     Diabetes type II Mother     Hearing loss Father         The [place] family has always been hard to hear my whole life    Hypertension Father     Congenital heart disease Sister     Thyroid cancer Sister     Cancer Sister         Had both lymph nodes removed and  is kow cancer free    Heart disease Paternal Grandfather         Had multiple bypasess and passed away from heart attack    Heart attack Paternal Grandfather     Heart disease Sister         Willie noth have Hillman-Parkinson-White syndrome but Milagros was treated for and had undergone surgery to correct when she was a teenager    Malig Hyperthermia Neg Hx        ROS     Review of Systems  Constitutional: No wt loss, fever, fatigue  Gastrointestinal: No nausea, abdominal pain  Behavioral/Psych: No insomnia or anxiety  Cardiovascular No chest pain or shortness of breath      Objective:           Vitals and nursing note reviewed.   Constitutional:       Appearance: Well-developed.   HENT:      Head: Normocephalic.      Right Ear: External ear normal.      Left Ear: External ear normal.   Neck:      Vascular: No JVD.   Pulmonary:      Effort: Pulmonary effort is normal. No respiratory distress.      Breath sounds: Normal breath sounds. No stridor. No rales.   Cardiovascular:      Normal rate. Regular rhythm.      No gallop.    Pulses:     Intact distal pulses.   Edema:     Peripheral edema absent.   Abdominal:      General: Bowel sounds are normal. There is no distension.      Palpations: Abdomen is soft.      Tenderness: There is no abdominal tenderness. There is no guarding.   Musculoskeletal: Normal range of motion.         General: No tenderness.      Cervical back: Normal range of motion. Skin:     General: Skin is warm.   Neurological:      Mental Status: Alert and oriented to person, place, and time.      Deep Tendon Reflexes: Reflexes are normal and symmetric.   Psychiatric:         Judgment: Judgment normal.         Procedures    Interpretation Summary         No ECG evidence of myocardial ischemia.    Negative clinical evidence of myocardial ischemia.    Findings consistent with a normal ECG stress test.    No ECG evidence of myocardial ischemia. Negative clinical evidence of myocardial ischemia. Findings consistent  with a normal ECG stress test.       Interpretation Summary         Left ventricular systolic function is normal. Calculated left ventricular EF = 65.4% Left ventricular ejection fraction appears to be 61 - 65%.    Left ventricular diastolic function was normal.         Current Outpatient Medications:     fluticasone (FLONASE) 50 MCG/ACT nasal spray, Administer 2 sprays into the nostril(s) as directed by provider Daily., Disp: , Rfl:     rosuvastatin (Crestor) 5 MG tablet, Take 1 tablet by mouth Daily., Disp: 90 tablet, Rfl: 0     Assessment:        Patient Active Problem List   Diagnosis    Pseudotumor cerebri syndrome    Double vision    Umbilical pain    Umbilical hernia without obstruction and without gangrene    Optic nerve disorder    Preop cardiovascular exam    Abnormal EKG    Pre-excitation syndrome    Calculus of gallbladder without cholecystitis without obstruction    Elevated LFTs    Other chest pain    Mixed hyperlipidemia    Prediabetes    ZAK (obstructive sleep apnea)               Plan:   Chest pain: no further episodes, stress test and echo normal    Stress testing carries 85% specificity/sensitivity/cardiac workup has been explained, and does not rule out coronary artery disease or future events, continue to emphasize on risk reductions for coronary artery disease.  Explained if symptoms continue please go to ER, and further w/p will be required.    2.  WPW: holter benign, no afib    3. Hyperlipidemia: recently started on crestor, he reports his pcp manages his cholesterol and labs, continue.     Risk of the hyperlipidemia, importance of the treatment has been explained. Pros and cons of the statins has been explained. Regular blood workup as well as side effects including the liver failure, myelopathy death has been explained.               No diagnosis found.    There are no diagnoses linked to this encounter.    COUNSELING: terra Trammell was given to patient for the  following topics: diagnostic results, risk factor reductions, impressions, risks and benefits of treatment options and importance of treatment compliance .       SMOKING COUNSELING: denies    Follow up in 1 year or sooner if needed    Sincerely,   TETE Moscoso  [facility]  01/07/25  13:36 EST    EMR Dragon/Transcription disclaimer:   Much of this encounter note is an electronic transcription/translation of spoken language to printed text. The electronic translation of spoken language may permit erroneous, or at times, nonsensical words or phrases to be inadvertently transcribed; Although I have reviewed the note for such errors, some may still exist.

## 2025-01-29 DIAGNOSIS — E78.2 MIXED HYPERLIPIDEMIA: ICD-10-CM

## 2025-01-30 RX ORDER — ROSUVASTATIN CALCIUM 5 MG/1
5 TABLET, COATED ORAL DAILY
Qty: 90 TABLET | Refills: 0 | Status: SHIPPED | OUTPATIENT
Start: 2025-01-30

## 2025-03-06 ENCOUNTER — OFFICE VISIT (OUTPATIENT)
Dept: INTERNAL MEDICINE | Age: 40
End: 2025-03-06
Payer: COMMERCIAL

## 2025-03-06 VITALS
BODY MASS INDEX: 44.1 KG/M2 | DIASTOLIC BLOOD PRESSURE: 88 MMHG | SYSTOLIC BLOOD PRESSURE: 126 MMHG | OXYGEN SATURATION: 95 % | WEIGHT: 315 LBS | TEMPERATURE: 97.6 F | HEART RATE: 70 BPM | HEIGHT: 71 IN

## 2025-03-06 DIAGNOSIS — R73.03 PREDIABETES: ICD-10-CM

## 2025-03-06 DIAGNOSIS — E78.2 MIXED HYPERLIPIDEMIA: Primary | ICD-10-CM

## 2025-03-06 DIAGNOSIS — G47.33 OSA (OBSTRUCTIVE SLEEP APNEA): ICD-10-CM

## 2025-03-06 LAB
ALBUMIN SERPL-MCNC: 4.5 G/DL (ref 3.5–5.2)
ALBUMIN/GLOB SERPL: 1.8 G/DL
ALP SERPL-CCNC: 51 U/L (ref 39–117)
ALT SERPL-CCNC: 40 U/L (ref 1–41)
AST SERPL-CCNC: 24 U/L (ref 1–40)
BILIRUB SERPL-MCNC: 1 MG/DL (ref 0–1.2)
BUN SERPL-MCNC: 14 MG/DL (ref 6–20)
BUN/CREAT SERPL: 13.2 (ref 7–25)
CALCIUM SERPL-MCNC: 9.5 MG/DL (ref 8.6–10.5)
CHLORIDE SERPL-SCNC: 106 MMOL/L (ref 98–107)
CHOLEST SERPL-MCNC: 183 MG/DL (ref 0–200)
CHOLEST/HDLC SERPL: 4.58 {RATIO}
CO2 SERPL-SCNC: 25.2 MMOL/L (ref 22–29)
CREAT SERPL-MCNC: 1.06 MG/DL (ref 0.76–1.27)
EGFRCR SERPLBLD CKD-EPI 2021: 91.6 ML/MIN/1.73
GLOBULIN SER CALC-MCNC: 2.5 GM/DL
GLUCOSE SERPL-MCNC: 104 MG/DL (ref 65–99)
HBA1C MFR BLD: 5.9 % (ref 4.8–5.6)
HDLC SERPL-MCNC: 40 MG/DL (ref 40–60)
LDLC SERPL CALC-MCNC: 114 MG/DL (ref 0–100)
POTASSIUM SERPL-SCNC: 4.6 MMOL/L (ref 3.5–5.2)
PROT SERPL-MCNC: 7 G/DL (ref 6–8.5)
SODIUM SERPL-SCNC: 143 MMOL/L (ref 136–145)
TRIGL SERPL-MCNC: 164 MG/DL (ref 0–150)
VLDLC SERPL CALC-MCNC: 29 MG/DL (ref 5–40)

## 2025-03-06 PROCEDURE — 99214 OFFICE O/P EST MOD 30 MIN: CPT

## 2025-03-06 NOTE — PROGRESS NOTES
"    I N T E R N A L  M E D I C I N E  Estephania Walker, APRN    ENCOUNTER DATE:  03/06/2025    Torey Nelson Jaimesroe / 39 y.o. / male      CHIEF COMPLAINT / REASON FOR OFFICE VISIT     Hyperlipidemia (Pt states he has felt more tired recently.)      ASSESSMENT & PLAN     Diagnoses and all orders for this visit:    1. Mixed hyperlipidemia (Primary)  -     Comprehensive Metabolic Panel  -     Lipid Panel With / Chol / HDL Ratio    2. Prediabetes  -     Comprehensive Metabolic Panel  -     Hemoglobin A1c    3. ZAK (obstructive sleep apnea)         SUMMARY/DISCUSSION  Recommend he update with sleep medicine to ensure accuracy of CPAP settings given chronic fatigue in view of ZAK diagnosis.  Encouraged increased exercise, healthy dietary habits - very important he work towards BMI reduction.  Update fasting lipid panel to help guide statin medication management for goal LDL < 100.  Encouraged low carb diet, regular exercise.       Next Appointment with me: 6/12/2025    Return in about 3 months (around 6/6/2025) for Annual physical.      VITAL SIGNS     Visit Vitals  /88   Pulse 70   Temp 97.6 °F (36.4 °C)   Ht 180.3 cm (71\")   Wt (!) 155 kg (342 lb)   SpO2 95%   BMI 47.70 kg/m²             Wt Readings from Last 3 Encounters:   03/06/25 (!) 155 kg (342 lb)   01/07/25 (!) 154 kg (339 lb)   12/30/24 (!) 153 kg (338 lb)     Body mass index is 47.7 kg/m².        MEDICATIONS AT THE TIME OF OFFICE VISIT     Current Outpatient Medications on File Prior to Visit   Medication Sig Dispense Refill    fluticasone (FLONASE) 50 MCG/ACT nasal spray Administer 2 sprays into the nostril(s) as directed by provider Daily.      rosuvastatin (Crestor) 5 MG tablet Take 1 tablet by mouth Daily. 90 tablet 0     No current facility-administered medications on file prior to visit.        HISTORY OF PRESENT ILLNESS     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  Next appointment is January 5, 2026.  Wears CPAP for " ZAK and reports chronic, non worsening fatigue.  No chest pain, dyspnea, palpitations.       HLD: Now on rosuvastatin 5 mg daily; tolerating well.  December 2024 Lipid panel with elevated ; elevated triglycerides 179.      Prediabetes: December 2024 A1C 5.6, normal.     BMI 47: Weight is up 4 pounds since December 2024.  Has not been focused on diet/ exercise over this winter, but does plan to work on meal prepping.  Plans to start walking at lunch.  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  Will graduate this year.        Patient Care Team:  Estephania Walker APRN as PCP - General (Family Medicine)  Samson Harris MD as Consulting Physician (Cardiology)    REVIEW OF SYSTEMS     Review of Systems   Constitutional:  Positive for fatigue (Chronic). Negative for chills, diaphoresis, fever and unexpected weight change.   HENT:  Negative for congestion and sore throat.    Respiratory:  Negative for cough, chest tightness and shortness of breath.    Cardiovascular:  Negative for chest pain, palpitations and leg swelling.   Gastrointestinal:  Negative for abdominal pain, nausea and vomiting.   Genitourinary:  Negative for dysuria.   Musculoskeletal:  Negative for myalgias.   Skin:  Negative for rash.   Neurological:  Negative for dizziness, weakness, light-headedness, numbness and headaches.   Psychiatric/Behavioral:  Positive for sleep disturbance (ZAK). The patient is not nervous/anxious.           PHYSICAL EXAMINATION     Physical Exam  Vitals reviewed.   Constitutional:       General: He is not in acute distress.     Appearance: Normal appearance. He is not ill-appearing, toxic-appearing or diaphoretic.   HENT:      Head: Normocephalic and atraumatic.   Cardiovascular:      Rate and Rhythm: Normal rate and regular rhythm.      Heart sounds: Normal heart sounds.   Pulmonary:      Effort: Pulmonary effort is normal.      Breath sounds: Normal breath sounds.   Musculoskeletal:      Right lower leg: No  edema.      Left lower leg: No edema.   Neurological:      Mental Status: He is alert and oriented to person, place, and time. Mental status is at baseline.   Psychiatric:         Mood and Affect: Mood normal.         Behavior: Behavior normal.         Thought Content: Thought content normal.         Judgment: Judgment normal.           REVIEWED DATA     Labs:           Imaging:            Medical Tests:           Summary of old records / correspondence / consultant report:           Request outside records:

## 2025-03-18 DIAGNOSIS — E78.2 MIXED HYPERLIPIDEMIA: Primary | ICD-10-CM

## 2025-03-18 RX ORDER — ROSUVASTATIN CALCIUM 10 MG/1
10 TABLET, COATED ORAL NIGHTLY
Qty: 90 TABLET | Refills: 0 | Status: SHIPPED | OUTPATIENT
Start: 2025-03-18

## 2025-04-03 ENCOUNTER — OFFICE VISIT (OUTPATIENT)
Facility: HOSPITAL | Age: 40
End: 2025-04-03
Payer: COMMERCIAL

## 2025-04-03 VITALS — HEIGHT: 71 IN | WEIGHT: 315 LBS | HEART RATE: 73 BPM | OXYGEN SATURATION: 93 % | BODY MASS INDEX: 44.1 KG/M2

## 2025-04-03 DIAGNOSIS — E78.2 MIXED HYPERLIPIDEMIA: ICD-10-CM

## 2025-04-03 DIAGNOSIS — R94.31 ABNORMAL EKG: ICD-10-CM

## 2025-04-03 DIAGNOSIS — G47.33 OSA (OBSTRUCTIVE SLEEP APNEA): Primary | ICD-10-CM

## 2025-04-03 PROCEDURE — G0463 HOSPITAL OUTPT CLINIC VISIT: HCPCS

## 2025-04-03 NOTE — PROGRESS NOTES
[facility] Up [facility] Note       Primary Care Physician: Estephania Walker APRN    Interval History:   The patient is a 39 y.o. male      History of Present Illness  The patient presents for evaluation of sleep apnea.    He has been experiencing difficulties with his nasal cup mask, which he uses in conjunction with his CPAP machine. The discomfort is primarily due to nasal drainage, which has been causing him to remove the mask after approximately an hour of use at night. He has been using a CPAP machine since the end of 2024 and believes he met the compliance criteria for the first few months of 2025. He recalls a phone call during which a plan was established, likely in January 2025. He reports no pain associated with the use of the CPAP machine. He has attempted to alleviate the issue with Tylenol Sinus and occasionally resorts to antihistamines such as Zyrtec when necessary. He also uses Flonase intermittently. He has been utilizing humidity with his CPAP machine and has experimented with different masks. Initially, he switched from an over-the-nose mask to a full-face mask due to nasal congestion but found the latter uncomfortable and recently reverted to the nasal mask.    SOCIAL HISTORY  He works in IT.    MEDICATIONS  Current: Tylenol, Zyrtec, Flonase         Downloaded PAP Data Evaluated For Therapeutic Response and Compliance:  DME is [facility].  Downloads between 1/15/2025 and 2/15/2025.  Average usage is 5 hours and 12 minutes and 75% for more than 4 hours.  Average AHI is 2.0.  PAP pressure is 9.3 CWP.    The patient uses a nasal cup mask interface.    Review of Systems:    A complete review of systems was done and all were negative with the exception of sinus congestion some nights    Social History:    Social History     Socioeconomic History    Marital status:    Tobacco Use    Smoking status: Never     Passive exposure: Never    Smokeless tobacco: Never   Vaping Use    Vaping status:  "Never Used   Substance and Sexual Activity    Alcohol use: Yes     Comment: I drink maybe twice a year and usually social drinking    Drug use: Never    Sexual activity: Yes     Partners: Female       Allergies:  Patient has no known allergies.     Medication Review:  Reviewed.      Vital Signs:    Vitals:    04/03/25 0821   Pulse: 73   SpO2: 93%   Weight: (!) 157 kg (345 lb 8 oz)   Height: 180.3 cm (71\")     Body mass index is 48.19 kg/m².  .BMIFOLLOWUP    Physical Exam:    Constitutional:  Well developed 39 y.o. male that appears in no apparent distress.  Awake & oriented times 3.  Normal mood with normal recent and remote memory and normal judgement.  Eyes:  Conjunctivae normal.      Self-administered [place] Sleepiness Scale test results: 6  0-5 Lower normal daytime sleepiness  6-10 Higher normal daytime sleepiness  11-12 Mild, 13-15 Moderate, & 16-24 Severe excessive daytime sleepiness       I have reviewed the above results and compared them with the patient's last downloads and reviewed with the patient.    Impression:     Encounter Diagnoses   Name Primary?    ZAK (obstructive sleep apnea) Yes    Mixed hyperlipidemia     Abnormal EKG          Assessment & Plan  1. Sleep Apnea.  He has been using his CPAP machine since the end of last year . He reported issues with nasal congestion and drainage, making it difficult to wear the nasal cup mask at night. He has been using antihistamines like Zyrtec and Flonase intermittently. He is advised to use antihistamines daily and Flonase regularly to manage his symptoms and improve CPAP compliance. His sleep apnea is severe, with 20 episodes per hour, which could lead to long-term health issues if not managed properly. He met the compliance criteria from 01/15/2025 to 02/15/2025, with an average usage of 5 hours and 12 minutes per night. The goal is to increase this to 7.5 to 8 hours per night. His residual apnea hypopnea index is currently at 2, which is within the " acceptable range (less than 5). His average median leak rate is zero, and his pressure setting is 9.3, which is on the lower end. New supplies will be ordered for him. He is encouraged to use his CPAP machine consistently and for longer durations.    Follow-up  The patient will follow up in 1 year.         Good sleep hygiene measures should be maintained.  Weight loss would be beneficial in this patient who obesity class III by Body mass index is 48.19 kg/m².      After evaluating the patient and assessing results available, the patient is benefiting from the treatment being provided.     The patient will continue CPAP.  Potential side effects of PAP therapy reviewed and addressed as needed.  After clinical evaluation and review of downloads, I recommend no changes to the patient's pressures.      I answered all of the patient's questions.  The patient will call the [facility] for any problems and will follow up 1 year.    Patient or patient representative verbalized consent for the use of Ambient Listening during the visit with  Nelson Harrington MD for chart documentation. 4/3/2025  09:15 EDT           Nelson Harrington MD  Sleep Medicine  04/03/25  09:15 EDT

## 2025-04-07 DIAGNOSIS — E78.2 MIXED HYPERLIPIDEMIA: ICD-10-CM

## 2025-04-07 RX ORDER — ROSUVASTATIN CALCIUM 5 MG/1
5 TABLET, COATED ORAL DAILY
Qty: 90 TABLET | Refills: 3 | OUTPATIENT
Start: 2025-04-07

## 2025-05-07 DIAGNOSIS — E78.2 MIXED HYPERLIPIDEMIA: ICD-10-CM

## 2025-05-07 RX ORDER — ROSUVASTATIN CALCIUM 10 MG/1
10 TABLET, COATED ORAL NIGHTLY
Qty: 90 TABLET | Refills: 0 | Status: SHIPPED | OUTPATIENT
Start: 2025-05-07

## 2025-05-27 ENCOUNTER — PATIENT OUTREACH (OUTPATIENT)
Dept: INTERNAL MEDICINE | Age: 40
End: 2025-05-27
Payer: COMMERCIAL

## 2025-05-27 DIAGNOSIS — Z00.00 PREVENTATIVE HEALTH CARE: Primary | ICD-10-CM

## 2025-06-12 ENCOUNTER — OFFICE VISIT (OUTPATIENT)
Dept: INTERNAL MEDICINE | Age: 40
End: 2025-06-12
Payer: COMMERCIAL

## 2025-06-12 VITALS
BODY MASS INDEX: 44.1 KG/M2 | DIASTOLIC BLOOD PRESSURE: 84 MMHG | TEMPERATURE: 96.1 F | SYSTOLIC BLOOD PRESSURE: 118 MMHG | HEART RATE: 69 BPM | OXYGEN SATURATION: 95 % | RESPIRATION RATE: 18 BRPM | WEIGHT: 315 LBS | HEIGHT: 71 IN

## 2025-06-12 DIAGNOSIS — E66.813 CLASS 3 SEVERE OBESITY DUE TO EXCESS CALORIES WITH SERIOUS COMORBIDITY AND BODY MASS INDEX (BMI) OF 45.0 TO 49.9 IN ADULT: ICD-10-CM

## 2025-06-12 DIAGNOSIS — E78.2 MIXED HYPERLIPIDEMIA: ICD-10-CM

## 2025-06-12 DIAGNOSIS — E66.01 CLASS 3 SEVERE OBESITY DUE TO EXCESS CALORIES WITH SERIOUS COMORBIDITY AND BODY MASS INDEX (BMI) OF 45.0 TO 49.9 IN ADULT: ICD-10-CM

## 2025-06-12 DIAGNOSIS — Z00.00 ANNUAL PHYSICAL EXAM: Primary | ICD-10-CM

## 2025-06-12 NOTE — PROGRESS NOTES
"    I N T E R N A L  M E D I C I N E  Estephania WalkerTETE      ENCOUNTER DATE:  06/12/2025    Torey Coy / 40 y.o. 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  Next appointment is January 5, 2026.  ZAK: Compliant with CPAP use.  Next appointment with sleep medicine is April 8, 2026.       HLD: Now on rosuvastatin 10 mg daily; tolerating well.  March 2025 Lipid panel with elevated ; elevated triglycerides 164.      Prediabetes: March 2025 A1C 5.9, normal.     BMI 48: Weight increasing despite healthy, low calorie diet and increased physical activity - walking throughout the day while at work.  This is heaviest weight ever for patient.  Interested in GLP-1 agents to help.  No personal history of pancreatitis/ thyroid cancer.  Sister with thyroid cancer - unsure what type - he will look into this.          VITALS     Visit Vitals  /84 (BP Location: Left arm, Patient Position: Sitting, Cuff Size: Large Adult)   Pulse 69   Temp 96.1 °F (35.6 °C) (Temporal)   Resp 18   Ht 180.3 cm (70.98\")   Wt (!) 157 kg (347 lb)   SpO2 95%   BMI 48.42 kg/m²       BP Readings from Last 3 Encounters:   06/12/25 118/84   03/06/25 126/88   01/07/25 123/92     Wt Readings from Last 3 Encounters:   06/12/25 (!) 157 kg (347 lb)   04/03/25 (!) 157 kg (345 lb 8 oz)   03/06/25 (!) 155 kg (342 lb)      Body mass index is 48.42 kg/m².      MEDICATIONS     Current Outpatient Medications on File Prior to Visit   Medication Sig Dispense Refill    fluticasone (FLONASE) 50 MCG/ACT nasal spray Administer 2 sprays into the nostril(s) as directed by provider Daily.      rosuvastatin (Crestor) 10 MG tablet Take 1 tablet by mouth Every Night. 90 tablet 0     No current facility-administered medications on file prior to visit.         HISTORY OF PRESENT ILLNESS      Torey presents for annual health maintenance visit.    General health: " fair  Lifestyle:  Attempting to lose weight?: Yes   Diet: eating healthier  Exercise: Exercise is improving - goal is 3 days a week of walking right now  Tobacco: Never used   Alcohol: occasional/infrequent  Work: Full-time  Reproductive health:  Sexually active?: Yes   Concern for STD?: No   Sexual problems?: No problems   Sees Urologist?: No   Depression Screening:          3/6/2025     9:08 AM   PHQ-2/PHQ-9 Depression Screening   Little interest or pleasure in doing things Not at all   Feeling down, depressed, or hopeless Not at all   How difficult have these problems made it for you to do your work, take care of things at home, or get along with other people? Not difficult at all         PHQ-2: 0 (Not depressed)     PHQ-9: 0 (Negative screening for depression)    Patient Care Team:  Estephania Walker APRN as PCP - General (Family Medicine)  Samson Harris MD as Consulting Physician (Cardiology)  ______________________________________________________________________    ALLERGIES  No Known Allergies     PFSH:     The following portions of the patient's history were reviewed and updated as appropriate: Allergies / Current Medications / Past Medical History / Surgical History / Social History / Family History    PROBLEM LIST   Patient Active Problem List   Diagnosis    Pseudotumor cerebri syndrome    Double vision    Umbilical pain    Umbilical hernia without obstruction and without gangrene    Optic nerve disorder    Preop cardiovascular exam    Abnormal EKG    Pre-excitation syndrome    Calculus of gallbladder without cholecystitis without obstruction    Elevated LFTs    Other chest pain    Mixed hyperlipidemia    Prediabetes    ZAK (obstructive sleep apnea)       PAST MEDICAL HISTORY  Past Medical History:   Diagnosis Date    Asthma     history as a child    Headache Since i was a kid    They fo not happen as often now that i have been eating better    HL (hearing loss) Since i was a kid    Left ear has had  multiple surgeries    Hyperlipidemia     Last coupke of test with primary care physician have shown higher levels    Kidney stone     I have had kidney stones twice    Migraine     treats with tylenol or advil and goes to sleep    ZAK (obstructive sleep apnea) 12/05/2024    Pre-excitation syndrome     followed by Dr. Harris after ABN EKG 6/30/2023    Umbilical hernia 11/03/2023       SURGICAL HISTORY  Past Surgical History:   Procedure Laterality Date    EXTRACORPOREAL SHOCKWAVE LITHOTRIPSY (ESWL), STENT INSERTION/REMOVAL Left 11/22/2019    Procedure: EXTRACORPOREAL SHOCKWAVE LITHOTRIPSY WITH CYSTOSCOPY, AND  LEFT STENT INSERTION;  Surgeon: Joseph Ramos MD;  Location: [facility] OR [facility] [place];  Service: Urology    TUMOR REMOVAL      LT ear as a child benign    UMBILICAL HERNIA REPAIR N/A 11/17/2023    Procedure: UMBILICAL HERNIA REPAIR WITH MESH;  Surgeon: Trenton Soto MD;  Location: [facility] OR [facility] [place];  Service: General;  Laterality: N/A;       SOCIAL HISTORY  Social History     Socioeconomic History    Marital status:    Tobacco Use    Smoking status: Never     Passive exposure: Never    Smokeless tobacco: Never   Vaping Use    Vaping status: Never Used   Substance and Sexual Activity    Alcohol use: Yes     Comment: I drink maybe twice a year and usually social drinking    Drug use: Never    Sexual activity: Yes     Partners: Female     Birth control/protection: None       FAMILY HISTORY  Family History   Problem Relation Age of Onset    Heart disease Mother     Heart failure Mother     Diabetes type II Mother     Hearing loss Father         The [place] family has always been hard to hear my whole life    Hypertension Father     Congenital heart disease Sister     Thyroid cancer Sister     Cancer Sister         Had both lymph nodes removed and is now cancer free    Heart disease Paternal Grandfather         Had multiple bypasess and passed away from heart attack    Heart attack Paternal Grandfather      Heart disease Sister         We both have Hillman-Parkinson-White syndrome but Milagros was treated for and had undergone surgery to correct when she was a teenager    Malig Hyperthermia Neg Hx        IMMUNIZATION HISTORY  Immunization History   Administered Date(s) Administered    COVID-19 (PFIZER) Purple Cap Monovalent 08/06/2021, 08/28/2021    Covid-19 (Pfizer) Gray Cap Monovalent 03/26/2022    Fluzone  >6mos 12/05/2024    Influenza, Unspecified 01/13/2021    Tdap 03/08/2011, 10/29/2022         REVIEW OF SYSTEMS     Review of Systems   Constitutional:  Negative for chills, fever and unexpected weight change.   Respiratory:  Negative for cough, chest tightness and shortness of breath.    Cardiovascular:  Negative for chest pain, palpitations and leg swelling.   Neurological:  Negative for dizziness, weakness, light-headedness and headaches.   Psychiatric/Behavioral:  Positive for sleep disturbance (ZAK). The patient is not nervous/anxious.        PHYSICAL EXAMINATION     Physical Exam  Vitals reviewed.   Constitutional:       General: He is not in acute distress.     Appearance: Normal appearance. He is not ill-appearing, toxic-appearing or diaphoretic.   HENT:      Head: Normocephalic and atraumatic.      Right Ear: Tympanic membrane, ear canal and external ear normal. There is no impacted cerumen.      Left Ear: Tympanic membrane, ear canal and external ear normal. There is no impacted cerumen.      Nose: Nose normal. No congestion or rhinorrhea.      Mouth/Throat:      Mouth: Mucous membranes are moist.      Pharynx: Oropharynx is clear. No oropharyngeal exudate or posterior oropharyngeal erythema.   Eyes:      Extraocular Movements: Extraocular movements intact.      Conjunctiva/sclera: Conjunctivae normal.      Pupils: Pupils are equal, round, and reactive to light.   Cardiovascular:      Rate and Rhythm: Normal rate and regular rhythm.      Heart sounds: Normal heart sounds.   Pulmonary:      Effort: Pulmonary  "effort is normal. No respiratory distress.      Breath sounds: Normal breath sounds.   Abdominal:      General: Bowel sounds are normal.      Palpations: Abdomen is soft.      Tenderness: There is no abdominal tenderness.   Musculoskeletal:         General: Normal range of motion.      Cervical back: Normal range of motion and neck supple.      Right lower leg: No edema.      Left lower leg: No edema.   Lymphadenopathy:      Cervical: No cervical adenopathy.   Skin:     General: Skin is warm and dry.   Neurological:      General: No focal deficit present.      Mental Status: He is alert and oriented to person, place, and time. Mental status is at baseline.   Psychiatric:         Mood and Affect: Mood normal.         Behavior: Behavior normal.         Thought Content: Thought content normal.         Judgment: Judgment normal.         REVIEWED DATA      Labs:    Lab Results   Component Value Date     03/06/2025    K 4.6 03/06/2025    CALCIUM 9.5 03/06/2025    AST 24 03/06/2025    ALT 40 03/06/2025    BUN 14 03/06/2025    CREATININE 1.06 03/06/2025    CREATININE 1.02 12/10/2024    CREATININE 1.05 12/05/2024    EGFRIFNONA 68 07/09/2020       Lab Results   Component Value Date    GLUCOSE 104 (H) 03/06/2025    HGBA1C 5.90 (H) 03/06/2025    HGBA1C 5.60 12/05/2024    HGBA1C 5.70 (H) 06/05/2024    TSH 1.690 06/05/2024    FREET4 1.09 06/05/2024       No results found for: \"PSA\"    [unfilled]    Lab Results   Component Value Date     (H) 03/06/2025    HDL 40 03/06/2025    TRIG 164 (H) 03/06/2025    CHOLHDLRATIO 4.58 03/06/2025       No components found for: \"GRCD685S\"    Lab Results   Component Value Date    WBC 6.93 12/12/2024    HGB 14.1 12/12/2024    MCV 86.3 12/12/2024     12/12/2024       Lab Results   Component Value Date    PROTEIN Trace (A) 06/05/2024    GLUCOSEU Negative 06/05/2024    BLOODU Negative 06/05/2024    NITRITEU Negative 06/05/2024    LEUKOCYTESUR Negative 06/05/2024          Lab Results "   Component Value Date    HEPCVIRUSABY Non Reactive 06/05/2024       Imaging:           Medical Tests:           ASSESSMENT & PLAN     ANNUAL WELLNESS EXAM / PHYSICAL     Diagnoses and all orders for this visit:    1. Annual physical exam (Primary)    2. Mixed hyperlipidemia    3. Class 3 severe obesity due to excess calories with serious comorbidity and body mass index (BMI) of 45.0 to 49.9 in adult         Summary/Discussion:     Update fasting lipid panel for goal LDL < 100 while on statin.  Encouraged healthy, low calorie and low fat diet.  Goal for exercise is 150 minutes weekly.  BMI 48: Reviewed risks, benefits, side effects of GLP-1 agents.  Consider Wegovy or Zepbound.  He will check with insurance on coverage and let me know.  Will also check with sister as to potential family history of thyroid cancer.      Next Appointment with me: Visit date not found    Return for 4 month chronic care, 1 year annual physical.      HEALTHCARE MAINTENANCE ISSUES       Cancer Screening:  Colon: Initial/Next screening at age: 45  Repeat colon cancer screening: N/A at this time  Prostate: No screening needed at this time  Testicular: Recommended monthly self exam  Skin: Monthly self skin examination, annual exam by health professional  Lung: Does not meet criteria for lung cancer screening.   Other:    Screening Labs & Tests:  Lab results reviewed & discussed with with patient or orders placed today.  EKG:  CV Screening: Lipid panel  DEXA (75+ or risk factors):   HEP C (If born [id number] or risk factors): Previously had negative screen  Other:     Immunization/Vaccinations (to be given today unless deferred by patient)  Influenza: Recommended annual influenza vaccine  Hepatitis A: Verify immunization records  Hepatitis B: Verify immunization records  Tetanus/Pertussis: Up to date  Pneumovax/PCV: Not needed at this time  Shingles: Not needed at this time  COVID: Does not plan to get the latest booster  Lifestyle  Counseling:  Lifestyle Modifications: Attempt to lose weight, Improve dietary compliance, Begin progressive aerobic exercise program 3-5 days a week, Follow a low fat, low cholesterol diet, Make dinner the lightest meal of day, and Reduce exposure to stress if possible  Safety Issues: Always wear seatbelt, Avoid texting while driving   Use sunscreen, regular skin examination  Recommended annual dental/vision examination.  Emotional/Stress/Sleep: Reviewed and  given when appropriate      Health Maintenance   Topic Date Due    COVID-19 Vaccine (4 - 2024-25 season) 12/12/2025 (Originally 9/1/2024)    INFLUENZA VACCINE  07/01/2025    LIPID PANEL  03/06/2026    ANNUAL PHYSICAL  06/12/2026    TDAP/TD VACCINES (3 - Td or Tdap) 10/29/2032    HEPATITIS C SCREENING  Completed    Pneumococcal Vaccine 0-49  Aged Out

## 2025-07-22 DIAGNOSIS — E78.2 MIXED HYPERLIPIDEMIA: ICD-10-CM

## 2025-07-22 RX ORDER — ROSUVASTATIN CALCIUM 10 MG/1
10 TABLET, COATED ORAL
Qty: 90 TABLET | Refills: 0 | Status: SHIPPED | OUTPATIENT
Start: 2025-07-22

## (undated) DEVICE — PATIENT RETURN ELECTRODE, SINGLE-USE, CONTACT QUALITY MONITORING, ADULT, WITH 9FT CORD, FOR PATIENTS WEIGING OVER 33LBS. (15KG): Brand: MEGADYNE

## (undated) DEVICE — SUT ETHIB 0/0 MO6 I8IN CX45D

## (undated) DEVICE — TRAP FLD MINIVAC MEGADYNE 100ML

## (undated) DEVICE — SUT VIC 3/0 TIES 18IN J110T

## (undated) DEVICE — ADHS SKIN SURG TISS VISC PREMIERPRO EXOFIN HI/VISC FAST/DRY

## (undated) DEVICE — SUT VIC 5/0 PS2 18IN J495H

## (undated) DEVICE — NITINOL WIRE WITH HYDROPHILIC TIP: Brand: SENSOR

## (undated) DEVICE — CATH URETRL FLXITP POLLACK STD 5F 70CM

## (undated) DEVICE — GLV SURG PREMIERPRO ORTHO LTX PF SZ7.5 BRN

## (undated) DEVICE — SUT VIC 3/0 SH 27IN J416H

## (undated) DEVICE — SKIN PREP TRAY W/CHG: Brand: MEDLINE INDUSTRIES, INC.

## (undated) DEVICE — TBG PENCL TELESCP MEGADYNE SMOKE EVAC 10FT

## (undated) DEVICE — PK PROC MINOR TOWER 40